# Patient Record
Sex: FEMALE | Race: AMERICAN INDIAN OR ALASKA NATIVE | NOT HISPANIC OR LATINO | Employment: FULL TIME | URBAN - METROPOLITAN AREA
[De-identification: names, ages, dates, MRNs, and addresses within clinical notes are randomized per-mention and may not be internally consistent; named-entity substitution may affect disease eponyms.]

---

## 2018-10-05 ENCOUNTER — TELEPHONE (OUTPATIENT)
Dept: HEMATOLOGY ONCOLOGY | Facility: MEDICAL CENTER | Age: 48
End: 2018-10-05

## 2018-10-05 NOTE — TELEPHONE ENCOUNTER
1st attempt to contact the patient- Left voicemail for patient requesting a return call to schedule New Oncology Genetic appointment. (Remind patient to bring a list/ or questionnaire, of her cancer related family history)  NP/ PEBP/ Breast Cancer/Dr. Acosta

## 2018-10-24 ENCOUNTER — TELEPHONE (OUTPATIENT)
Dept: HEMATOLOGY ONCOLOGY | Facility: MEDICAL CENTER | Age: 48
End: 2018-10-24

## 2018-10-24 NOTE — TELEPHONE ENCOUNTER
Patient cancelled upcoming appointment with Dr. Gatica on 10/29/2018 at 9:00 am. Patient is having surgery on 11/12/2018 (mastectomy) and would like to wait until afterwards. Will call back as soon as possible.

## 2018-10-29 ENCOUNTER — APPOINTMENT (OUTPATIENT)
Dept: HEMATOLOGY ONCOLOGY | Facility: MEDICAL CENTER | Age: 48
End: 2018-10-29
Payer: COMMERCIAL

## 2018-10-30 ENCOUNTER — TELEPHONE (OUTPATIENT)
Dept: HEMATOLOGY ONCOLOGY | Facility: MEDICAL CENTER | Age: 48
End: 2018-10-30

## 2018-10-30 NOTE — TELEPHONE ENCOUNTER
2nd attempt to schedule patient:    Called patient to schedule new patient Genetics appointment with Dr. Gatica. Patient states she is trying to coordinate with her follow ups from surgery. States she doesn't know the dates yet but will call us as soon as she has the dates to schedule.    NP/ PEBP/Dx: Breast Cancer/Ref: Dr. Acosta

## 2018-11-12 ENCOUNTER — APPOINTMENT (OUTPATIENT)
Dept: RADIOLOGY | Facility: MEDICAL CENTER | Age: 48
End: 2018-11-12
Attending: SURGERY
Payer: COMMERCIAL

## 2018-11-12 ENCOUNTER — HOSPITAL ENCOUNTER (OUTPATIENT)
Facility: MEDICAL CENTER | Age: 48
End: 2018-11-13
Attending: SURGERY | Admitting: SURGERY
Payer: COMMERCIAL

## 2018-11-12 DIAGNOSIS — D05.11 DUCTAL CARCINOMA IN SITU (DCIS) OF RIGHT BREAST: ICD-10-CM

## 2018-11-12 DIAGNOSIS — G89.18 ACUTE POST-OPERATIVE PAIN: ICD-10-CM

## 2018-11-12 LAB — PATHOLOGY CONSULT NOTE: NORMAL

## 2018-11-12 PROCEDURE — 88307 TISSUE EXAM BY PATHOLOGIST: CPT | Mod: 59

## 2018-11-12 PROCEDURE — 700111 HCHG RX REV CODE 636 W/ 250 OVERRIDE (IP): Performed by: SURGERY

## 2018-11-12 PROCEDURE — A6222 GAUZE <=16 IN NO W/SAL W/O B: HCPCS | Performed by: SURGERY

## 2018-11-12 PROCEDURE — A6223 GAUZE >16<=48 NO W/SAL W/O B: HCPCS | Performed by: SURGERY

## 2018-11-12 PROCEDURE — 160025 RECOVERY II MINUTES (STATS): Performed by: SURGERY

## 2018-11-12 PROCEDURE — 700101 HCHG RX REV CODE 250

## 2018-11-12 PROCEDURE — 501838 HCHG SUTURE GENERAL: Performed by: SURGERY

## 2018-11-12 PROCEDURE — 160036 HCHG PACU - EA ADDL 30 MINS PHASE I: Performed by: SURGERY

## 2018-11-12 PROCEDURE — 160009 HCHG ANES TIME/MIN: Performed by: SURGERY

## 2018-11-12 PROCEDURE — 700111 HCHG RX REV CODE 636 W/ 250 OVERRIDE (IP)

## 2018-11-12 PROCEDURE — 700102 HCHG RX REV CODE 250 W/ 637 OVERRIDE(OP): Performed by: SURGERY

## 2018-11-12 PROCEDURE — 700111 HCHG RX REV CODE 636 W/ 250 OVERRIDE (IP): Performed by: ANESTHESIOLOGY

## 2018-11-12 PROCEDURE — 76098 X-RAY EXAM SURGICAL SPECIMEN: CPT | Mod: RT

## 2018-11-12 PROCEDURE — 38792 RA TRACER ID OF SENTINL NODE: CPT | Mod: RT

## 2018-11-12 PROCEDURE — G0378 HOSPITAL OBSERVATION PER HR: HCPCS

## 2018-11-12 PROCEDURE — 160048 HCHG OR STATISTICAL LEVEL 1-5: Performed by: SURGERY

## 2018-11-12 PROCEDURE — 500054 HCHG BANDAGE, ELASTIC 6: Performed by: SURGERY

## 2018-11-12 PROCEDURE — A6404 STERILE GAUZE > 48 SQ IN: HCPCS | Performed by: SURGERY

## 2018-11-12 PROCEDURE — 88342 IMHCHEM/IMCYTCHM 1ST ANTB: CPT

## 2018-11-12 PROCEDURE — A6402 STERILE GAUZE <= 16 SQ IN: HCPCS | Performed by: SURGERY

## 2018-11-12 PROCEDURE — 501497 HCHG SURGICLIP: Performed by: SURGERY

## 2018-11-12 PROCEDURE — 700105 HCHG RX REV CODE 258: Performed by: ANESTHESIOLOGY

## 2018-11-12 PROCEDURE — 96365 THER/PROPH/DIAG IV INF INIT: CPT

## 2018-11-12 PROCEDURE — 500448 HCHG DRESSING, TELFA 3X4: Performed by: SURGERY

## 2018-11-12 PROCEDURE — 88309 TISSUE EXAM BY PATHOLOGIST: CPT

## 2018-11-12 PROCEDURE — 160002 HCHG RECOVERY MINUTES (STAT): Performed by: SURGERY

## 2018-11-12 PROCEDURE — 96375 TX/PRO/DX INJ NEW DRUG ADDON: CPT

## 2018-11-12 PROCEDURE — 501445 HCHG STAPLER, SKIN DISP: Performed by: SURGERY

## 2018-11-12 PROCEDURE — 88331 PATH CONSLTJ SURG 1 BLK 1SPC: CPT

## 2018-11-12 PROCEDURE — 160029 HCHG SURGERY MINUTES - 1ST 30 MINS LEVEL 4: Performed by: SURGERY

## 2018-11-12 PROCEDURE — 88332 PATH CONSLTJ SURG EA ADD BLK: CPT

## 2018-11-12 PROCEDURE — 160046 HCHG PACU - 1ST 60 MINS PHASE II: Performed by: SURGERY

## 2018-11-12 PROCEDURE — 110372 HCHG SHELL REV 278: Performed by: SURGERY

## 2018-11-12 PROCEDURE — A9270 NON-COVERED ITEM OR SERVICE: HCPCS | Performed by: SURGERY

## 2018-11-12 PROCEDURE — 160035 HCHG PACU - 1ST 60 MINS PHASE I: Performed by: SURGERY

## 2018-11-12 PROCEDURE — 700101 HCHG RX REV CODE 250: Performed by: SURGERY

## 2018-11-12 PROCEDURE — 160041 HCHG SURGERY MINUTES - EA ADDL 1 MIN LEVEL 4: Performed by: SURGERY

## 2018-11-12 DEVICE — IMPLANTABLE DEVICE: Type: IMPLANTABLE DEVICE | Site: BREAST | Status: FUNCTIONAL

## 2018-11-12 RX ORDER — OXYCODONE HYDROCHLORIDE 5 MG/1
5 TABLET ORAL
Status: DISCONTINUED | OUTPATIENT
Start: 2018-11-12 | End: 2018-11-12

## 2018-11-12 RX ORDER — HALOPERIDOL 5 MG/ML
1 INJECTION INTRAMUSCULAR
Status: DISCONTINUED | OUTPATIENT
Start: 2018-11-12 | End: 2018-11-12

## 2018-11-12 RX ORDER — OXYCODONE HYDROCHLORIDE 10 MG/1
10 TABLET ORAL
Status: DISCONTINUED | OUTPATIENT
Start: 2018-11-12 | End: 2018-11-12

## 2018-11-12 RX ORDER — MEPERIDINE HYDROCHLORIDE 25 MG/ML
25 INJECTION INTRAMUSCULAR; INTRAVENOUS; SUBCUTANEOUS
Status: DISCONTINUED | OUTPATIENT
Start: 2018-11-12 | End: 2018-11-12

## 2018-11-12 RX ORDER — EPINEPHRINE 1 MG/ML
INJECTION INTRAMUSCULAR; INTRAVENOUS; SUBCUTANEOUS
Status: COMPLETED
Start: 2018-11-12 | End: 2018-11-12

## 2018-11-12 RX ORDER — DIPHENHYDRAMINE HCL 25 MG
25 TABLET ORAL EVERY 6 HOURS PRN
Status: DISCONTINUED | OUTPATIENT
Start: 2018-11-12 | End: 2018-11-13 | Stop reason: HOSPADM

## 2018-11-12 RX ORDER — ASCORBIC ACID 500 MG
500 TABLET ORAL DAILY
Status: DISCONTINUED | OUTPATIENT
Start: 2018-11-12 | End: 2018-11-13 | Stop reason: HOSPADM

## 2018-11-12 RX ORDER — ONDANSETRON 2 MG/ML
4 INJECTION INTRAMUSCULAR; INTRAVENOUS EVERY 4 HOURS PRN
Status: DISCONTINUED | OUTPATIENT
Start: 2018-11-12 | End: 2018-11-13 | Stop reason: HOSPADM

## 2018-11-12 RX ORDER — HYDROMORPHONE HYDROCHLORIDE 1 MG/ML
0.5 INJECTION, SOLUTION INTRAMUSCULAR; INTRAVENOUS; SUBCUTANEOUS
Status: DISCONTINUED | OUTPATIENT
Start: 2018-11-12 | End: 2018-11-12

## 2018-11-12 RX ORDER — ASCORBIC ACID 500 MG
500 TABLET ORAL DAILY
COMMUNITY

## 2018-11-12 RX ORDER — SODIUM CHLORIDE, SODIUM LACTATE, POTASSIUM CHLORIDE, CALCIUM CHLORIDE 600; 310; 30; 20 MG/100ML; MG/100ML; MG/100ML; MG/100ML
INJECTION, SOLUTION INTRAVENOUS ONCE
Status: COMPLETED | OUTPATIENT
Start: 2018-11-12 | End: 2018-11-12

## 2018-11-12 RX ORDER — ONDANSETRON 4 MG/1
4 TABLET, FILM COATED ORAL EVERY 6 HOURS PRN
Qty: 6 TAB | Refills: 2 | Status: SHIPPED | OUTPATIENT
Start: 2018-11-12 | End: 2020-07-14

## 2018-11-12 RX ORDER — DIPHENHYDRAMINE HYDROCHLORIDE 50 MG/ML
12.5 INJECTION INTRAMUSCULAR; INTRAVENOUS
Status: DISCONTINUED | OUTPATIENT
Start: 2018-11-12 | End: 2018-11-12

## 2018-11-12 RX ORDER — LEVOTHYROXINE SODIUM 112 UG/1
112 TABLET ORAL
Status: DISCONTINUED | OUTPATIENT
Start: 2018-11-12 | End: 2018-11-13 | Stop reason: HOSPADM

## 2018-11-12 RX ORDER — LIDOCAINE AND PRILOCAINE 25; 25 MG/G; MG/G
CREAM TOPICAL ONCE
Status: COMPLETED | OUTPATIENT
Start: 2018-11-12 | End: 2018-11-12

## 2018-11-12 RX ORDER — SODIUM CHLORIDE, SODIUM LACTATE, POTASSIUM CHLORIDE, CALCIUM CHLORIDE 600; 310; 30; 20 MG/100ML; MG/100ML; MG/100ML; MG/100ML
INJECTION, SOLUTION INTRAVENOUS CONTINUOUS
Status: DISCONTINUED | OUTPATIENT
Start: 2018-11-12 | End: 2018-11-12

## 2018-11-12 RX ORDER — OXYCODONE HYDROCHLORIDE 5 MG/1
5-10 TABLET ORAL EVERY 6 HOURS PRN
Qty: 40 TAB | Refills: 0 | Status: SHIPPED | OUTPATIENT
Start: 2018-11-12 | End: 2018-11-22

## 2018-11-12 RX ORDER — SODIUM CHLORIDE, SODIUM LACTATE, POTASSIUM CHLORIDE, CALCIUM CHLORIDE 600; 310; 30; 20 MG/100ML; MG/100ML; MG/100ML; MG/100ML
INJECTION, SOLUTION INTRAVENOUS CONTINUOUS
Status: DISCONTINUED | OUTPATIENT
Start: 2018-11-12 | End: 2018-11-13 | Stop reason: HOSPADM

## 2018-11-12 RX ORDER — OXYCODONE HYDROCHLORIDE 5 MG/1
5 TABLET ORAL EVERY 4 HOURS PRN
Status: DISCONTINUED | OUTPATIENT
Start: 2018-11-12 | End: 2018-11-13 | Stop reason: HOSPADM

## 2018-11-12 RX ORDER — HYDROMORPHONE HYDROCHLORIDE 1 MG/ML
0.25 INJECTION, SOLUTION INTRAMUSCULAR; INTRAVENOUS; SUBCUTANEOUS
Status: DISCONTINUED | OUTPATIENT
Start: 2018-11-12 | End: 2018-11-12

## 2018-11-12 RX ORDER — ALPRAZOLAM 0.25 MG/1
0.25 TABLET ORAL
Status: COMPLETED | OUTPATIENT
Start: 2018-11-12 | End: 2018-11-12

## 2018-11-12 RX ORDER — OXYCODONE HCL 5 MG/5 ML
5 SOLUTION, ORAL ORAL
Status: DISCONTINUED | OUTPATIENT
Start: 2018-11-12 | End: 2018-11-12

## 2018-11-12 RX ORDER — CEPHALEXIN 500 MG/1
500 CAPSULE ORAL 3 TIMES DAILY
Qty: 42 CAP | Refills: 0 | Status: ON HOLD | OUTPATIENT
Start: 2018-11-12 | End: 2019-03-14

## 2018-11-12 RX ORDER — BUPIVACAINE HYDROCHLORIDE 2.5 MG/ML
INJECTION, SOLUTION EPIDURAL; INFILTRATION; INTRACAUDAL
Status: COMPLETED
Start: 2018-11-12 | End: 2018-11-12

## 2018-11-12 RX ORDER — SCOLOPAMINE TRANSDERMAL SYSTEM 1 MG/1
1 PATCH, EXTENDED RELEASE TRANSDERMAL
Status: DISCONTINUED | OUTPATIENT
Start: 2018-11-12 | End: 2018-11-12 | Stop reason: HOSPADM

## 2018-11-12 RX ORDER — CEFAZOLIN SODIUM 2 G/100ML
2 INJECTION, SOLUTION INTRAVENOUS EVERY 8 HOURS
Status: DISCONTINUED | OUTPATIENT
Start: 2018-11-12 | End: 2018-11-13 | Stop reason: HOSPADM

## 2018-11-12 RX ORDER — BUPIVACAINE HYDROCHLORIDE AND EPINEPHRINE 5; 5 MG/ML; UG/ML
INJECTION, SOLUTION EPIDURAL; INTRACAUDAL; PERINEURAL
Status: COMPLETED
Start: 2018-11-12 | End: 2018-11-12

## 2018-11-12 RX ORDER — OXYCODONE HYDROCHLORIDE 10 MG/1
10 TABLET ORAL EVERY 4 HOURS PRN
Status: DISCONTINUED | OUTPATIENT
Start: 2018-11-12 | End: 2018-11-13 | Stop reason: HOSPADM

## 2018-11-12 RX ORDER — OXYCODONE HCL 5 MG/5 ML
10 SOLUTION, ORAL ORAL
Status: DISCONTINUED | OUTPATIENT
Start: 2018-11-12 | End: 2018-11-12

## 2018-11-12 RX ORDER — SCOLOPAMINE TRANSDERMAL SYSTEM 1 MG/1
1 PATCH, EXTENDED RELEASE TRANSDERMAL
Status: DISCONTINUED | OUTPATIENT
Start: 2018-11-12 | End: 2018-11-13 | Stop reason: HOSPADM

## 2018-11-12 RX ORDER — HYDROMORPHONE HYDROCHLORIDE 1 MG/ML
0.1 INJECTION, SOLUTION INTRAMUSCULAR; INTRAVENOUS; SUBCUTANEOUS
Status: DISCONTINUED | OUTPATIENT
Start: 2018-11-12 | End: 2018-11-12

## 2018-11-12 RX ORDER — ONDANSETRON 2 MG/ML
4 INJECTION INTRAMUSCULAR; INTRAVENOUS
Status: DISCONTINUED | OUTPATIENT
Start: 2018-11-12 | End: 2018-11-12

## 2018-11-12 RX ADMIN — SCOPALAMINE 1 PATCH: 1 PATCH, EXTENDED RELEASE TRANSDERMAL at 20:23

## 2018-11-12 RX ADMIN — LIDOCAINE AND PRILOCAINE 1 APPLICATION: 25; 25 CREAM TOPICAL at 09:11

## 2018-11-12 RX ADMIN — ONDANSETRON 4 MG: 2 INJECTION INTRAMUSCULAR; INTRAVENOUS at 17:01

## 2018-11-12 RX ADMIN — SODIUM CHLORIDE, SODIUM LACTATE, POTASSIUM CHLORIDE, CALCIUM CHLORIDE 1000 ML: 600; 310; 30; 20 INJECTION, SOLUTION INTRAVENOUS at 09:11

## 2018-11-12 RX ADMIN — OXYCODONE HYDROCHLORIDE 5 MG: 5 TABLET ORAL at 18:36

## 2018-11-12 RX ADMIN — HYDROMORPHONE HYDROCHLORIDE 0.25 MG: 1 INJECTION, SOLUTION INTRAMUSCULAR; INTRAVENOUS; SUBCUTANEOUS at 14:59

## 2018-11-12 RX ADMIN — SODIUM CHLORIDE, POTASSIUM CHLORIDE, SODIUM LACTATE AND CALCIUM CHLORIDE: 600; 310; 30; 20 INJECTION, SOLUTION INTRAVENOUS at 20:21

## 2018-11-12 RX ADMIN — HYDROMORPHONE HYDROCHLORIDE 0.25 MG: 1 INJECTION, SOLUTION INTRAMUSCULAR; INTRAVENOUS; SUBCUTANEOUS at 15:30

## 2018-11-12 RX ADMIN — SODIUM CHLORIDE, POTASSIUM CHLORIDE, SODIUM LACTATE AND CALCIUM CHLORIDE: 600; 310; 30; 20 INJECTION, SOLUTION INTRAVENOUS at 17:01

## 2018-11-12 RX ADMIN — CEFAZOLIN SODIUM 2 G: 2 INJECTION, SOLUTION INTRAVENOUS at 18:35

## 2018-11-12 RX ADMIN — ALPRAZOLAM 0.25 MG: 0.25 TABLET ORAL at 09:11

## 2018-11-12 ASSESSMENT — LIFESTYLE VARIABLES: ALCOHOL_USE: NO

## 2018-11-12 ASSESSMENT — PATIENT HEALTH QUESTIONNAIRE - PHQ9
SUM OF ALL RESPONSES TO PHQ9 QUESTIONS 1 AND 2: 0
SUM OF ALL RESPONSES TO PHQ9 QUESTIONS 1 AND 2: 0
1. LITTLE INTEREST OR PLEASURE IN DOING THINGS: NOT AT ALL
2. FEELING DOWN, DEPRESSED, IRRITABLE, OR HOPELESS: NOT AT ALL
2. FEELING DOWN, DEPRESSED, IRRITABLE, OR HOPELESS: NOT AT ALL
1. LITTLE INTEREST OR PLEASURE IN DOING THINGS: NOT AT ALL

## 2018-11-12 ASSESSMENT — COGNITIVE AND FUNCTIONAL STATUS - GENERAL
SUGGESTED CMS G CODE MODIFIER DAILY ACTIVITY: CH
MOBILITY SCORE: 24
DAILY ACTIVITIY SCORE: 24
SUGGESTED CMS G CODE MODIFIER MOBILITY: CH

## 2018-11-12 ASSESSMENT — COPD QUESTIONNAIRES
HAVE YOU SMOKED AT LEAST 100 CIGARETTES IN YOUR ENTIRE LIFE: NO/DON'T KNOW
IN THE PAST 12 MONTHS DO YOU DO LESS THAN YOU USED TO BECAUSE OF YOUR BREATHING PROBLEMS: DISAGREE/UNSURE
DO YOU EVER COUGH UP ANY MUCUS OR PHLEGM?: NO/ONLY WITH OCCASIONAL COLDS OR INFECTIONS
DURING THE PAST 4 WEEKS HOW MUCH DID YOU FEEL SHORT OF BREATH: NONE/LITTLE OF THE TIME
COPD SCREENING SCORE: 0

## 2018-11-12 ASSESSMENT — PAIN SCALES - GENERAL
PAINLEVEL_OUTOF10: 5
PAINLEVEL_OUTOF10: 0
PAINLEVEL_OUTOF10: 4
PAINLEVEL_OUTOF10: 4
PAINLEVEL_OUTOF10: 7
PAINLEVEL_OUTOF10: 4

## 2018-11-12 NOTE — OR NURSING
1345- Report from ARASH Haskins. Assumed care for break coverage.  1353- Pt moaning, medicated for assumed pain,  at bedside.   1417- Pt moaning again in pain, medicated with 25 mcg of fentanyl.  1421- Pt took a sip of water, back to sleep at this time.   1446- pt moaning in pain, medicated per MAR. Report back to ARASH Haskins.

## 2018-11-12 NOTE — OR NURSING
REPORT FROM HELGA ANTOINE.  PATIENT SLEEPING  VSS.  DRAINS EMPTIED.  RIGHT ARM ELEVATED ON PILLOW  1458 PATIENT RATES PAIN 5/10  CONTINUE TO MEDICATE  1515 SLEEPING.   REMAINS AT BEDSIDE  1530 EASILY AWAKENED.  STATES PAIN IMPROVED.  REQUESTS MORE PAIN MED.  GIVEN PER ORDER.  1548 SLEEPING.  DRESSINGS DRY AND IN TACT. REPORT TO ELIDA ANTOINE.  TRANSPORT REQUESTED.  1630 T/C FROM DR MCCOLLUM. ADVISED OF BLOOD PRESSURE. PER DR MCCOLLUM  PATIENT APPROPRIATE FOR TRANSFER.    1638 TRANSPORT HERE.   AT SIDE WITH BELONGINGS.

## 2018-11-12 NOTE — H&P
Surgery Plastic History & Physical Note    Date  11/12/2018    Primary Care Physician  Kyle Gore M.D.    CC  Scheduled for bilateral mastectomy  HPI  This is a 48 y.o. 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 at SURGERY SAME DAY [facility]   • THYROID LOBECTOMY  1/8/2014    Performed by Caitlin Mooney M.D. at SURGERY SAME DAY [facility]   • GASTRIC SLEEVE LAPAROSCOPY  4/1/2013    Performed by John H Ganser, M.D. at SURGERY [facility]   • HYSTERECTOMY LAPAROSCOPY  2008   • OTHER ORTHOPEDIC SURGERY  1989    right knee arthroscopic surgery   • OTHER  [id number]    lumps removed from both breasts multiple surgeries       Current Facility-Administered Medications   Medication Dose Route Frequency Provider Last Rate Last Dose   • BUPIVACAINE-EPINEPHRINE (PF) 0.5% -1:[id number] INJ SOLN            • BUPIVACAINE HCL (PF) 0.25 % INJ SOLN            • EPINEPHRINE 1 MG/ML INJ SOLN            • METHYLENE BLUE 1 % INJ SOLN                Social History     Social History   • Marital status:      Spouse name: N/A   • Number of children: N/A   • Years of education: N/A     Occupational History   • Not on file.     Social History Main Topics   • Smoking status: Never Smoker   • Smokeless tobacco: Never Used   • Alcohol use No   • Drug use: No   • Sexual activity: Not on file     Other Topics Concern   • Not on file     Social History  Narrative   • No narrative on file       Family History   Problem Relation Age of Onset   • Heart Disease Unknown    • Hypertension Unknown    • Cancer Unknown    • Stroke Unknown        Allergies  Other food    Review of Systems  Non contributory      Physical Exam    Vital Signs  Blood Pressure: 134/89   Temperature: 37.1 °C (98.8 °F)   Pulse: 67   Respiration: 18   Pulse Oximetry: 98 %     Lungs are clear  Heart is RR    Labs:None                    Radiology:  NM-INJ TRACER ID SENTINAL NODE RIGHT   Final Result      Successful injection of radionuclide at 4 locations surrounding the RIGHT breast areola.            Assessment/Plan:    Procedure(s):  MASTECTOMY- SIMPLE  NODE BIOPSY SENTINEL- AXILLARY LYMPH  AXILLARY NODE DISSECTION- POSS  BREAST RECONSTRUCTION  TISSUE EXPANDER PLACE/REMOVE

## 2018-11-12 NOTE — OR SURGEON
Immediate Post OP Note    PreOp Diagnosis: acquired absence of bilateral breast and nipple    PostOp Diagnosis: same    Procedure(s):  MASTECTOMY- SIMPLE - Wound Class: Clean  NODE BIOPSY SENTINEL- AXILLARY LYMPH - Wound Class: Clean  BREAST RECONSTRUCTION - Wound Class: Clean  TISSUE EXPANDER PLACE/REMOVE - Wound Class: Clean    Surgeon(s):  JOE Multani Jr., M.D. Sharon L Wright, M.D.    Anesthesiologist/Type of Anesthesia:  Anesthesiologist: Hima Sanchez M.D./General    Surgical Staff:  Assistant: Nhi Boss  Circulator: Luigi Smith R.N.  Relief Scrub: Colette Painter  Scrub Person: Valentina Heath    Specimens removed if any:  ID Type Source Tests Collected by Time Destination   A : Left breast Tissue Breast HISTOLOGY REQUEST Ammy Cabrera M.D. 11/12/2018 12:12 PM    B : Right breast Tissue Breast HISTOLOGY REQUEST Ammy Cabrera M.D. 11/12/2018 12:22 PM    C : Right axillary sentinal lymph node Tissue Lymph Node FROZEN SECTION REQUEST Ammy Cabrera M.D. 11/12/2018 12:34 PM        Estimated Blood Loss: 10 ml    Findings: absent breasts    Complications: none        11/12/2018 1:24 PM Tee Hayes M.D.

## 2018-11-12 NOTE — OP REPORT
Operative Report     Date: 11/12/2018     Surgeon: Ammy Cabrera M.D.     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 right simple mastectomy (19303     Mastectomy, simple, complete)  2. right axillary sentinel lymph node biopsy (58454     Biopsy or excision of lymph node(s); open, deep axillary node(s))  3. injection for identification of sentinel lymph node (98182 Intraoperative identification (eg, mapping) of sentinel lymph node(s) includes injection of non-radioactive dye)  4. left simple mastectomy (19303   Mastectomy, simple, complete)     Findings: negative sentinel lymph node.     Procedure in detail: The patient was identified in the pre-operative holding area and brought to the operating room. Correct side and site were identified. Prior to surgery, she had undergone radionucleotide injection by nuclear medicine. Pre-operative antibiotics of ancef were administered prior to the procedure. GETA was smoothly induced.     I infiltrated 5 cc of methylene blue under the right nipple areolar complex in order to help identify the sentinel lymph node.      The patient's bilateral anterior chest wall, neck, axilla, and left arm were prepped and draped in the usual sterile manner. We started on the left side. The incision was marked along the skin with a skin marker. A transverse elliptical incision was made in the breast of the skin to include nipple areolar complex. The flaps were raised superiorly to just below the clavicle, medially to the sternum, laterally towards the latissimus dorsi, and inferiorly to the rectus abdominus fascia. Following this, the breast tissue along with the pectoralis major fascia were dissected off the pectoralis major muscle. The dissection was started medially and extended laterally towards the left axilla. The breast was  "then removed, oriented with suture, and passed off the table.     We then ensured hemostasis on the left, and turned our attention to the right side.     The incision was marked along the skin with a skin marker. A transverse elliptical incision was made in the breast of the skin to include nipple areolar complex. The flaps were raised superiorly to just below the clavicle, medially to the sternum, laterally towards the latissimus dorsi, and inferiorly to the rectus abdominus fascia. Following this, the breast tissue along with the pectoralis major fascia were dissected off the pectoralis major muscle. The dissection was started medially and extended laterally towards the left axilla. The breast was then removed, oriented with suture, and passed off the table.     We then proceeded with the sentinel lymph node excision. Using the probe, we identified a \"hot\" right axillary sentinel lymph node, which was also blue stained. This was excised and sent for frozen pathology, which revealed no evidence of metastatic cancer.     After the tissues were irrigated, and hemostasis was assured, I turned the operation over to Dr. Hayes, for reconstruction.     The patient was awakened from general anesthetic, and was taken to the recovery room in stable condition.     Sponge and needle counts were correct at the end of the case.      Specimen:   1. Left breast  2. Right breast  3. Right axillary sentinel lymph node tissue    EBL: 200     Drains: 10 mm drain bilateral mastectomy site     Dispo: stable, extubated, to PACU     Ammy Cabrera M.D.  [facility]  [phone]          "

## 2018-11-12 NOTE — OR NURSING
RECEIVED FROM OR WITH DR MCCOLLUM.  ET TUBE DC'D BY DR MCCOLLUM UPON ARRIVAL.  VSS ACE WRAP AROUND CHEST DRY AND IN TACT  ESTEBAN DRAIN ON EACH SIDE.  1345 SEEMS CONFUSED AND MOANING.  MEDICATED PER ORDER AND  BROUGHT TO BEDSIDE.  REPORT TO HELGA ANTOINE

## 2018-11-12 NOTE — OP REPORT
DATE OF SERVICE:  11/12/2018    PREOPERATIVE DIAGNOSIS:  Acquired absence of bilateral breasts and nipple.    POSTOPERATIVE DIAGNOSIS:  Acquired absence of bilateral breasts and nipple.    PROCEDURE PERFORMED:  Bilateral breast reconstruction utilizing soft tissue   expander.    SURGEON:  Tee Hayes MD    ASSISTANT:  ANDREW Kahn    OPERATIVE INDICATIONS:  A 48-year-old female.  She was scheduled to undergo   bilateral mastectomies.  Preoperatively, I had discussions with the patient   regarding the risks, benefits, alternatives to proceeding with breast   reconstruction in the immediate timeframe.  Over the course of our discussion,   the patient selected a soft tissue expander based reconstructive option and   we further discussed this.  At the conclusion of our consultation, the patient   understood the risks, benefits, alternatives of the procedure and wished to   proceed.  The patient was interviewed in the preoperative holding area.  She   was marked and she consented to proceed with surgery.    OPERATIVE DESCRIPTION:  Dr. Cabrera's portion will be dictated separately.  At   the conclusion of Dr. Cabrera's portion, the operative sites were inspected and   found to be hemostatic.  On both sides, the pectoralis major muscle was   divided inferiorly and inferomedially under direct vision using   electrocautery.  Two symmetric subpectoral pockets were made under direct   vision using cautery.  Perforating blood vessels were encountered, they were   grasped and cauterized before dividing them.  The pockets were made symmetric.    The pockets were made hemostatic.  The pockets were irrigated with an   antibiotic containing irrigant solution.  Two 500 mL high profile Altouro  expanders were selected.  They were evacuated of air.  They were bathed in   antibiotic saline solution.  They were placed using a no-touch technique.    They oriented so that they were symmetric and in the appropriate position.    The  suture tabs were used to secure the expander to the chest wall using a 2-0   Vicryl suture.  A portion of precut perforated AlloDerm was used on both   sides.  Before being placed, it was bathed in antibiotic saline solution.  The   AlloDerm was secured around the inferior edge of the pectoralis major muscle   and also out to the inframammary fold.  Wounds were irrigated.  Again,   hemostasis was assured.  Two 7 flat Reginald drains were brought out through   separate stab incisions.  Wounds were closed in layers.  At the conclusion of   closure, the magnetic port was used and the port was identified on both sites.    Both expanders were filled to 300 mL using a closed fill system.  Sterile   dressings and a compressive bra were applied.  The patient was extubated and   taken to recovery room in good condition.       ____________________________________     MD FIDEL ACOSTA / BLANQUITA    DD:  11/12/2018 15:16:58  DT:  11/12/2018 15:41:08    D#:  [id number]  Job#:  [id number]

## 2018-11-13 VITALS
RESPIRATION RATE: 16 BRPM | OXYGEN SATURATION: 97 % | HEIGHT: 66 IN | BODY MASS INDEX: 34.01 KG/M2 | TEMPERATURE: 97.7 F | WEIGHT: 211.64 LBS | SYSTOLIC BLOOD PRESSURE: 109 MMHG | DIASTOLIC BLOOD PRESSURE: 63 MMHG | HEART RATE: 63 BPM

## 2018-11-13 PROCEDURE — 96366 THER/PROPH/DIAG IV INF ADDON: CPT

## 2018-11-13 PROCEDURE — G0378 HOSPITAL OBSERVATION PER HR: HCPCS

## 2018-11-13 PROCEDURE — 700102 HCHG RX REV CODE 250 W/ 637 OVERRIDE(OP): Performed by: SURGERY

## 2018-11-13 PROCEDURE — A9270 NON-COVERED ITEM OR SERVICE: HCPCS | Performed by: SURGERY

## 2018-11-13 PROCEDURE — 700111 HCHG RX REV CODE 636 W/ 250 OVERRIDE (IP): Performed by: SURGERY

## 2018-11-13 RX ADMIN — LEVOTHYROXINE SODIUM 112 MCG: 112 TABLET ORAL at 05:11

## 2018-11-13 RX ADMIN — OXYCODONE HYDROCHLORIDE 5 MG: 5 TABLET ORAL at 05:25

## 2018-11-13 RX ADMIN — CEFAZOLIN SODIUM 2 G: 2 INJECTION, SOLUTION INTRAVENOUS at 05:11

## 2018-11-13 RX ADMIN — OXYCODONE HYDROCHLORIDE AND ACETAMINOPHEN 500 MG: 500 TABLET ORAL at 05:11

## 2018-11-13 ASSESSMENT — PAIN SCALES - GENERAL: PAINLEVEL_OUTOF10: 5

## 2018-11-13 NOTE — CARE PLAN
Problem: Safety  Goal: Will remain free from injury  Outcome: PROGRESSING AS EXPECTED  Patient standby assist, steady on feet.     Problem: Pain Management  Goal: Pain level will decrease to patient's comfort goal  Outcome: PROGRESSING AS EXPECTED   11/12/18 2000   OTHER   Pain Scale 0 - 10  4   Non Verbal Scale  Calm   Comfort Goal Comfort at Rest

## 2018-11-13 NOTE — PROGRESS NOTES
Bedside report received.  Assessment complete.  Neuro, cardiac, GI, , WDL  A&O x 4. Patient calls appropriately.  Patient up with no assist. Bed alarm NI.   Patient has 0/10 pain.   Denies N&V. Tolerating regular diet.  Skin: Surgical incisions to chest with 2x ESTEBAN drains and elastic wrap in place.  + void, + flatus  Patient denies SOB.  SCD's in place.  Patient educated on DC POC.  Review plan with of care with patient. Call light and personal belongings with in reach. Hourly rounding in place. All needs met at this time.

## 2018-11-13 NOTE — PROGRESS NOTES
Pt discharged with DENISA Hilario in W/C. Prescriptions were given x 1. D/C instructions signed and placed in chart. Chart given to U/C. Charge notified. Medications from pts drawer returned to pharmacy. Controlled substance use informed consent signed.

## 2018-11-13 NOTE — PROGRESS NOTES
"Surgical Progress Note:     POD# 1  S/P B mastectomy and R SLN bx     No acute events.    PE:  /68   Pulse (!) 58   Temp 37.1 °C (98.8 °F)   Resp 20   Ht 1.676 m (5' 6\")   Wt 96 kg (211 lb 10.3 oz)   SpO2 96%   Breastfeeding? No   BMI 34.16 kg/m²     I/O:   Intake/Output Summary (Last 24 hours) at 11/13/18 0800  Last data filed at 11/13/18 0600   Gross per 24 hour   Intake             2250 ml   Output             1776 ml   Net              474 ml     UOP: 1250  PO: 650  IV: 1600  Drains: 148/128    Incisions c/d/i  JPs s/s    A/P: POD# 1  S/P B mastectomy and R SLN bx  - doing well  - d/c home  - f/u with me and Freddy Cabrera M.D.  [facility]  [phone]      "

## 2018-11-13 NOTE — DISCHARGE SUMMARY
DATE OF ADMISSION:  11/12/2018    DATE OF DISCHARGE:  11/13/2018    ADMISSION DIAGNOSIS:  Status post mastectomy with immediate breast   reconstruction.    HOSPITAL NARRATIVE:  On the day of admission, the patient underwent bilateral   mastectomy with a right sentinel node biopsy.  This was accompanied by   immediate breast reconstruction utilizing soft tissue expanders.  The   patient's postoperative course was uneventful.  On the day of discharge,   patient was not having any complaints.  She was afebrile.  She was tolerating   regular diet and had her pain well controlled on oral narcotics.  Patient   understood all of her discharge instructions and followup has been arranged.    DISPOSITION:  To home.    FOLLOWUP:  Followup is with Dr. Cabrera on Tuesday of next week and Dr. Hayes on   Tuesday of next week.    MEDICATIONS AT THE TIME OF DISCHARGE:  Include Keflex 500 mg 1 tab p.o. t.i.d.   while the drains are in place, OxyIR 5-10 mg tablets q. 4 hours p.r.n., and   Zofran 4 mg tablets q. 6 hours p.r.n. nausea.    SPECIAL INSTRUCTIONS:  Patient was educated on wound care and showering.    Patient was also told to remove her Ace wrap in 2 days and replace it with a   sports bra.       ____________________________________     MD FIDEL ACOSTA / BLANQUITA    DD:  11/13/2018 08:01:34  DT:  11/13/2018 08:26:16    D#:  [id number]  Job#:  [id number]

## 2018-11-13 NOTE — PROGRESS NOTES
Received report from day shift RN. Patient resting in bed, A&Ox4, call bell in reach, RA,  at bedside, post op VS complete, LR@100-20LAC, 2 ESTEBAN drains present, denies pain at this time, PRN scop. Patch available for nausea. Patient agrees to call for any needs during shift.

## 2018-11-13 NOTE — DISCHARGE INSTRUCTIONS
Keep ACE wrap in place for 48 hours then DC.   Wear sports bra once ACE wrap is removed   OK to shower at 48 hours   Call with any worries or concerns   Call if one breast is greatly enlarged compared with the other.Keep ACE wrap in place for 48 hours then DC.   Wear sports bra once ACE wrap is removed   OK to shower at 48 hours   Call with any worries or concerns   Call if one breast is greatly enlarged compared with the other.   Routine ESTEBAN care    Discharge Instructions    Discharged to home by car with relative. Discharged via wheelchair, hospital escort: Yes.  Special equipment needed: Not Applicable    Be sure to schedule a follow-up appointment with your primary care doctor or any specialists as instructed.     Discharge Plan:   Influenza Vaccine Indication: Patient Refuses    I understand that a diet low in cholesterol, fat, and sodium is recommended for good health. Unless I have been given specific instructions below for another diet, I accept this instruction as my diet prescription.   Other diet: regular     Special Instructions: None    · Is patient discharged on Warfarin / Coumadin?   No     Depression / Suicide Risk    As you are discharged from this [facility] facility, it is important to learn how to keep safe from harming yourself.    Recognize the warning signs:  · Abrupt changes in personality, positive or negative- including increase in energy   · Giving away possessions  · Change in eating patterns- significant weight changes-  positive or negative  · Change in sleeping patterns- unable to sleep or sleeping all the time   · Unwillingness or inability to communicate  · Depression  · Unusual sadness, discouragement and loneliness  · Talk of wanting to die  · Neglect of personal appearance   · Rebelliousness- reckless behavior  · Withdrawal from people/activities they love  · Confusion- inability to concentrate     If you or a loved one observes any of these behaviors or has concerns about  self-harm, here's what you can do:  · Talk about it- your feelings and reasons for harming yourself  · Remove any means that you might use to hurt yourself (examples: pills, rope, extension cords, firearm)  · Get professional help from the community (Mental Health, Substance Abuse, psychological counseling)  · Do not be alone:Call your Safe Contact- someone whom you trust who will be there for you.  · Call your local CRISIS HOTLINE 424-1131 or 014-273-4478  · Call your local Children's Mobile Crisis Response Team [place] (006) 191-0760 or www.[employer]  · Call the toll free National Suicide Prevention Hotlines   · National Suicide Prevention Lifeline 461-799-JMJV (8027)  · National Hope Line Network 800-SUICIDE (816-0368)        Mastectomy With or Without Reconstruction  Care After  Please read the instructions outlined below and refer to this sheet in the next few weeks. These discharge instructions provide you with general information on caring for yourself after you leave the hospital. Your caregiver may also give you specific instructions. While your treatment has been planned according to the most current medical practices available, unavoidable complications occasionally occur. If you have any problems or questions after discharge, please call your caregiver.  POST-OPERATIVE EXERCISES  · Lie in bed with your arm at your side. Raise your arm straight up and back, as if reaching for the headboard.  · Lying in bed, clasp your hands behind your head and push your elbows into the mattress.  · Raise your shoulders and rotate them forward, down, and back in a circular motion to loosen your chest, shoulder, and upper back muscles.  · Lying with your elbow bent and your arm on the bed at a 90 degree angle to your body, rotate your shoulder forward and bring your forearm down toward your feet. Then bring your forearm back up.  · With your arm raised parallel to the floor, clench and unclench your  "fist.  · Standing with your palm flat against a wall, \"walk\" your fingers up the wall.  SEEK MEDICAL CARE IF:   · There is redness, swelling, or increasing pain in the wound.  · There is pus coming from the wound.  · There is drainage from a wound lasting longer than one day.  · An unexplained oral temperature above 102° F (38.9° C) develops.  · You notice a foul smell coming from the wound or dressing.  · There is a breaking open of a wound (edges not staying together) after the sutures have been removed.  · You develop dizzy episodes or fainting while standing.  · You develop persistent nausea or vomiting.  SEEK IMMEDIATE MEDICAL CARE IF:   · You develop a rash.  · You have difficulty breathing, or develop any reaction or side effects to medications given.  Document Released: 07/07/2006 Document Revised: 03/11/2013 Document Reviewed: 11/19/2008  [employer]® Patient Information ©2014 [employer], [employer].    "

## 2018-11-15 ENCOUNTER — HOSPITAL ENCOUNTER (OUTPATIENT)
Dept: RADIOLOGY | Facility: MEDICAL CENTER | Age: 48
End: 2018-11-15

## 2018-11-27 ENCOUNTER — PATIENT OUTREACH (OUTPATIENT)
Dept: OTHER | Facility: MEDICAL CENTER | Age: 48
End: 2018-11-27

## 2018-12-14 ENCOUNTER — PATIENT OUTREACH (OUTPATIENT)
Dept: OTHER | Facility: MEDICAL CENTER | Age: 48
End: 2018-12-14

## 2018-12-15 NOTE — PROGRESS NOTES
"Oncology Nurse Navigator telephone outreach. Pt states she is doing well post surgery and is now focusing on the reconstruction process. She stated she was appreciative to receive the information regarding classes and programs, but states she put the information away for later \"just in case\". Explained that many of the classes are available virtually if she would like to participate from home. Discussed the contents and purpose of a survivorship care plan and alerted her that we will call to review it with her once she receives it in the mail.   "

## 2018-12-21 ENCOUNTER — PATIENT OUTREACH (OUTPATIENT)
Dept: OTHER | Facility: MEDICAL CENTER | Age: 48
End: 2018-12-21

## 2019-01-15 ENCOUNTER — PATIENT OUTREACH (OUTPATIENT)
Dept: OTHER | Facility: MEDICAL CENTER | Age: 49
End: 2019-01-15

## 2019-01-15 ENCOUNTER — NON-PROVIDER VISIT (OUTPATIENT)
Dept: HEMATOLOGY ONCOLOGY | Facility: MEDICAL CENTER | Age: 49
End: 2019-01-15
Payer: COMMERCIAL

## 2019-01-15 ENCOUNTER — OFFICE VISIT (OUTPATIENT)
Dept: HEMATOLOGY ONCOLOGY | Facility: MEDICAL CENTER | Age: 49
End: 2019-01-15
Payer: COMMERCIAL

## 2019-01-15 DIAGNOSIS — Z15.01 BREAST CANCER GENETIC SUSCEPTIBILITY: ICD-10-CM

## 2019-01-15 DIAGNOSIS — C73 THYROID CANCER (HCC): ICD-10-CM

## 2019-01-15 DIAGNOSIS — C50.411 MALIGNANT NEOPLASM OF UPPER-OUTER QUADRANT OF RIGHT FEMALE BREAST, UNSPECIFIED ESTROGEN RECEPTOR STATUS (HCC): ICD-10-CM

## 2019-01-15 DIAGNOSIS — Z80.3 FAMILY HISTORY OF BREAST CANCER: ICD-10-CM

## 2019-01-15 PROCEDURE — 99243 OFF/OP CNSLTJ NEW/EST LOW 30: CPT | Mod: 25 | Performed by: MEDICAL GENETICS

## 2019-01-15 PROCEDURE — 99358 PROLONG SERVICE W/O CONTACT: CPT | Performed by: MEDICAL GENETICS

## 2019-01-15 RX ORDER — GLATIRAMER 40 MG/ML
INJECTION, SOLUTION SUBCUTANEOUS
Status: ON HOLD | COMMUNITY
End: 2019-03-14

## 2019-01-15 ASSESSMENT — PAIN SCALES - GENERAL: PAINLEVEL: NO PAIN

## 2019-01-15 NOTE — PROGRESS NOTES
Phoned to review breast cancer treatment summary/ survivorship care plan. Pt confirmed she received it and stated she had no questions or concerns at this time regarding treatment summary or follow up plan.

## 2019-01-15 NOTE — PROGRESS NOTES
GENETIC RISK ASSESSMENT    Temi Howard is a 48 y.o. year old patient who was referred by Rick for cancer genetic risk assessment.    Chief Complaint:   Chief Complaint   Patient presents with   • New Patient     Ref \ DX: Breast cancer.       HPI: The patient was well until 5 months ago at which time a suspicious physical exam caused the patient to be referred for imaging. A suspicious (mammogram study identified a (right) breast mass. A biopsy  was performed and a specimen was submitted to pathology.     A diagnosis of  Invasive ductal carcinoma was made.   The patient was also diagosed with thyroid cancer 4 years ago  The patient's family history is remarkable for 4 family members with breast cancer   Review of personal and family histories suggested that a cancer genetic risk assessment was in order.    Review of Systems (ROS):  Constitutional normal   Eyes normal   ENT normla    Respiratory normla   Cardiovascular hnormal  Gastrointestinal normal  Genitourinary  normal  Musculoskeletal normal  Skin normal   Neurological normal  Endocrine s/p thyroidectomy (synthroid)  Psychiatric normal   Hematologic/lymphatic normal  Allergic/Immunologic none  All other systems reviewed and are negative.    History (Past/Family)  Family History:   Family History   Problem Relation Age of Onset   • Heart Disease Unknown    • Hypertension Unknown    • Cancer Unknown    • Stroke Unknown       3 generation pedigree built   Yes   [place] empiric risk calculation No   [place] II empiric risk calculation  No    Social History:       Social History     Social History   • Marital status:      Spouse name: N/A   • Number of children: N/A   • Years of education: N/A     Social History Main Topics   • Smoking status: Never Smoker   • Smokeless tobacco: Never Used   • Alcohol use No   • Drug use: No   • Sexual activity: Not on file     Other Topics Concern   • Not on file     Social History Narrative   • No  narrative on file       Patient Past History:  Past Medical History:   Diagnosis Date   • Anesthesia     PONV   • Anxiety    • Cancer (HCC) 2014    thyroid   • Hypertension     hx of, not on any medication currently   • MS (multiple sclerosis) (HCC)    • Unspecified disorder of thyroid     cancer           NCCN Testing Criteria Met                            Yes    Physical Exam  Constitutional    There were no vitals taken for this visit.        General appearance: normal    Head Circumference:   ([place])  Eyes    Conjunctiva: normal    Pupils: reactive     ENMT    External inspection: normal    Assessment of Hearing: normal    Lips/cheeks/gums: no lesions  Neck   External exam: normal    Thyroid: no masses  Respiratory   Auscultation: clear    Cardiovascular   Auscultation: RRR   Edema : none  Chest   Breasts (exam): not done   Palpation:   GI   External exam and palpation:  Normal      Pelvic (female): not done   External exam (male):   Lymphatic   Palpation in 2 areas: normal     Musculoskeletal   Gait: normal    Digits and Nails: no lesions  Skin   Inspection: normal    Palpation: no masses                  Fibrofolliculoma: absent                  Trichodiscoma: absent                   Akrochordon: absent                   CAfe-au-lait:  absent                  Hypopigmentation: absent                  Mucosal freckling:  absent  Neurologic   Cranial nerves: intact   DTR’s: 2+       Assessment and Plan:  Patient with diagnosis of breast and thyroid cancer and family history of breast cancer\    40 Minutes (FACE TO FACE) >50% of time spent in Counseling and coordination of care discussing risks and benefits of molecular DNA testing    Ambry panel ordered

## 2019-01-15 NOTE — PROGRESS NOTES
"Prechart clinical data and referral information reviewed 1/14  Counseling and testing information prepared     \"I spent 30 minutes 1/14 from 1100 to 1130  reviewing past records, prior to  visit.\"          "

## 2019-01-17 VITALS
HEIGHT: 66 IN | WEIGHT: 213 LBS | OXYGEN SATURATION: 98 % | BODY MASS INDEX: 34.23 KG/M2 | HEART RATE: 87 BPM | RESPIRATION RATE: 16 BRPM | SYSTOLIC BLOOD PRESSURE: 126 MMHG | TEMPERATURE: 99.3 F | DIASTOLIC BLOOD PRESSURE: 80 MMHG

## 2019-01-17 VITALS
RESPIRATION RATE: 16 BRPM | DIASTOLIC BLOOD PRESSURE: 80 MMHG | TEMPERATURE: 99.3 F | HEART RATE: 87 BPM | SYSTOLIC BLOOD PRESSURE: 126 MMHG | BODY MASS INDEX: 34.37 KG/M2 | HEIGHT: 66 IN | WEIGHT: 213.85 LBS | OXYGEN SATURATION: 98 %

## 2019-01-17 ASSESSMENT — PAIN SCALES - GENERAL: PAINLEVEL: NO PAIN

## 2019-01-17 NOTE — NON-PROVIDER
Temi Howard is a 48 y.o. female here for a non-provider visit for: Lab Draws  on 1/17/2019 at 8:38 AM    Procedure Performed: Venipuncture     Anatomical site: Left Antecubital Area (AC)    Equipment used: 21g    Labs drawn: [place].    Ordering Provider: Dr. Michael Gatica    Mello By: Tanya Mena, Med Ass't   No complications.  was present the entire time the patients labs were being drawn.

## 2019-03-13 ENCOUNTER — ANESTHESIA EVENT (OUTPATIENT)
Dept: SURGERY | Facility: MEDICAL CENTER | Age: 49
End: 2019-03-13
Payer: COMMERCIAL

## 2019-03-14 ENCOUNTER — HOSPITAL ENCOUNTER (OUTPATIENT)
Facility: MEDICAL CENTER | Age: 49
End: 2019-03-14
Attending: PLASTIC SURGERY | Admitting: PLASTIC SURGERY
Payer: COMMERCIAL

## 2019-03-14 ENCOUNTER — ANESTHESIA (OUTPATIENT)
Dept: SURGERY | Facility: MEDICAL CENTER | Age: 49
End: 2019-03-14
Payer: COMMERCIAL

## 2019-03-14 VITALS
RESPIRATION RATE: 17 BRPM | OXYGEN SATURATION: 96 % | BODY MASS INDEX: 35.36 KG/M2 | DIASTOLIC BLOOD PRESSURE: 80 MMHG | HEART RATE: 75 BPM | WEIGHT: 220.02 LBS | HEIGHT: 66 IN | TEMPERATURE: 97.4 F | SYSTOLIC BLOOD PRESSURE: 132 MMHG

## 2019-03-14 DIAGNOSIS — G89.18 ACUTE POST-OPERATIVE PAIN: ICD-10-CM

## 2019-03-14 PROCEDURE — 501445 HCHG STAPLER, SKIN DISP: Performed by: PLASTIC SURGERY

## 2019-03-14 PROCEDURE — 160002 HCHG RECOVERY MINUTES (STAT): Performed by: PLASTIC SURGERY

## 2019-03-14 PROCEDURE — 160035 HCHG PACU - 1ST 60 MINS PHASE I: Performed by: PLASTIC SURGERY

## 2019-03-14 PROCEDURE — 700101 HCHG RX REV CODE 250

## 2019-03-14 PROCEDURE — 160041 HCHG SURGERY MINUTES - EA ADDL 1 MIN LEVEL 4: Performed by: PLASTIC SURGERY

## 2019-03-14 PROCEDURE — 500423 HCHG DRESSING, ABD COMBINE: Performed by: PLASTIC SURGERY

## 2019-03-14 PROCEDURE — A9270 NON-COVERED ITEM OR SERVICE: HCPCS | Performed by: PLASTIC SURGERY

## 2019-03-14 PROCEDURE — 700111 HCHG RX REV CODE 636 W/ 250 OVERRIDE (IP): Performed by: ANESTHESIOLOGY

## 2019-03-14 PROCEDURE — 160009 HCHG ANES TIME/MIN: Performed by: PLASTIC SURGERY

## 2019-03-14 PROCEDURE — 160046 HCHG PACU - 1ST 60 MINS PHASE II: Performed by: PLASTIC SURGERY

## 2019-03-14 PROCEDURE — L8600 IMPLANT BREAST SILICONE/EQ: HCPCS | Performed by: PLASTIC SURGERY

## 2019-03-14 PROCEDURE — 160029 HCHG SURGERY MINUTES - 1ST 30 MINS LEVEL 4: Performed by: PLASTIC SURGERY

## 2019-03-14 PROCEDURE — 501838 HCHG SUTURE GENERAL: Performed by: PLASTIC SURGERY

## 2019-03-14 PROCEDURE — 500054 HCHG BANDAGE, ELASTIC 6: Performed by: PLASTIC SURGERY

## 2019-03-14 PROCEDURE — 160025 RECOVERY II MINUTES (STATS): Performed by: PLASTIC SURGERY

## 2019-03-14 PROCEDURE — A9270 NON-COVERED ITEM OR SERVICE: HCPCS | Performed by: ANESTHESIOLOGY

## 2019-03-14 PROCEDURE — 160036 HCHG PACU - EA ADDL 30 MINS PHASE I: Performed by: PLASTIC SURGERY

## 2019-03-14 PROCEDURE — 700102 HCHG RX REV CODE 250 W/ 637 OVERRIDE(OP): Performed by: ANESTHESIOLOGY

## 2019-03-14 PROCEDURE — A6222 GAUZE <=16 IN NO W/SAL W/O B: HCPCS | Performed by: PLASTIC SURGERY

## 2019-03-14 PROCEDURE — A6223 GAUZE >16<=48 NO W/SAL W/O B: HCPCS | Performed by: PLASTIC SURGERY

## 2019-03-14 PROCEDURE — 160048 HCHG OR STATISTICAL LEVEL 1-5: Performed by: PLASTIC SURGERY

## 2019-03-14 PROCEDURE — 700111 HCHG RX REV CODE 636 W/ 250 OVERRIDE (IP)

## 2019-03-14 PROCEDURE — 700105 HCHG RX REV CODE 258: Performed by: ANESTHESIOLOGY

## 2019-03-14 PROCEDURE — 700101 HCHG RX REV CODE 250: Performed by: ANESTHESIOLOGY

## 2019-03-14 PROCEDURE — 700102 HCHG RX REV CODE 250 W/ 637 OVERRIDE(OP): Performed by: PLASTIC SURGERY

## 2019-03-14 DEVICE — IMPLANTABLE DEVICE: Type: IMPLANTABLE DEVICE | Site: BREAST | Status: FUNCTIONAL

## 2019-03-14 RX ORDER — OXYCODONE HCL 5 MG/5 ML
10 SOLUTION, ORAL ORAL
Status: COMPLETED | OUTPATIENT
Start: 2019-03-14 | End: 2019-03-14

## 2019-03-14 RX ORDER — ONDANSETRON 2 MG/ML
INJECTION INTRAMUSCULAR; INTRAVENOUS PRN
Status: DISCONTINUED | OUTPATIENT
Start: 2019-03-14 | End: 2019-03-14 | Stop reason: SURG

## 2019-03-14 RX ORDER — CELECOXIB 200 MG/1
200 CAPSULE ORAL ONCE
Status: COMPLETED | OUTPATIENT
Start: 2019-03-14 | End: 2019-03-14

## 2019-03-14 RX ORDER — SCOLOPAMINE TRANSDERMAL SYSTEM 1 MG/1
1 PATCH, EXTENDED RELEASE TRANSDERMAL
Status: DISCONTINUED | OUTPATIENT
Start: 2019-03-14 | End: 2019-03-14 | Stop reason: HOSPADM

## 2019-03-14 RX ORDER — SODIUM CHLORIDE, SODIUM LACTATE, POTASSIUM CHLORIDE, CALCIUM CHLORIDE 600; 310; 30; 20 MG/100ML; MG/100ML; MG/100ML; MG/100ML
INJECTION, SOLUTION INTRAVENOUS
Status: DISCONTINUED | OUTPATIENT
Start: 2019-03-14 | End: 2019-03-14 | Stop reason: SURG

## 2019-03-14 RX ORDER — DEXAMETHASONE SODIUM PHOSPHATE 4 MG/ML
INJECTION, SOLUTION INTRA-ARTICULAR; INTRALESIONAL; INTRAMUSCULAR; INTRAVENOUS; SOFT TISSUE PRN
Status: DISCONTINUED | OUTPATIENT
Start: 2019-03-14 | End: 2019-03-14 | Stop reason: SURG

## 2019-03-14 RX ORDER — HALOPERIDOL 5 MG/ML
1 INJECTION INTRAMUSCULAR
Status: DISCONTINUED | OUTPATIENT
Start: 2019-03-14 | End: 2019-03-14 | Stop reason: HOSPADM

## 2019-03-14 RX ORDER — CEPHALEXIN 500 MG/1
500 CAPSULE ORAL 3 TIMES DAILY
Qty: 15 CAP | Refills: 0 | Status: SHIPPED | OUTPATIENT
Start: 2019-03-14 | End: 2020-07-14

## 2019-03-14 RX ORDER — MIDAZOLAM HYDROCHLORIDE 1 MG/ML
1 INJECTION INTRAMUSCULAR; INTRAVENOUS
Status: DISCONTINUED | OUTPATIENT
Start: 2019-03-14 | End: 2019-03-14 | Stop reason: HOSPADM

## 2019-03-14 RX ORDER — ONDANSETRON 2 MG/ML
4 INJECTION INTRAMUSCULAR; INTRAVENOUS
Status: DISCONTINUED | OUTPATIENT
Start: 2019-03-14 | End: 2019-03-14 | Stop reason: HOSPADM

## 2019-03-14 RX ORDER — SODIUM CHLORIDE, SODIUM LACTATE, POTASSIUM CHLORIDE, CALCIUM CHLORIDE 600; 310; 30; 20 MG/100ML; MG/100ML; MG/100ML; MG/100ML
INJECTION, SOLUTION INTRAVENOUS CONTINUOUS
Status: DISCONTINUED | OUTPATIENT
Start: 2019-03-14 | End: 2019-03-14 | Stop reason: HOSPADM

## 2019-03-14 RX ORDER — MEPERIDINE HYDROCHLORIDE 25 MG/ML
6.25 INJECTION INTRAMUSCULAR; INTRAVENOUS; SUBCUTANEOUS
Status: DISCONTINUED | OUTPATIENT
Start: 2019-03-14 | End: 2019-03-14 | Stop reason: HOSPADM

## 2019-03-14 RX ORDER — CEFAZOLIN SODIUM 1 G/3ML
INJECTION, POWDER, FOR SOLUTION INTRAMUSCULAR; INTRAVENOUS PRN
Status: DISCONTINUED | OUTPATIENT
Start: 2019-03-14 | End: 2019-03-14 | Stop reason: SURG

## 2019-03-14 RX ORDER — OXYCODONE HCL 5 MG/5 ML
5 SOLUTION, ORAL ORAL
Status: COMPLETED | OUTPATIENT
Start: 2019-03-14 | End: 2019-03-14

## 2019-03-14 RX ORDER — MIDAZOLAM HYDROCHLORIDE 1 MG/ML
INJECTION INTRAMUSCULAR; INTRAVENOUS
Status: COMPLETED
Start: 2019-03-14 | End: 2019-03-14

## 2019-03-14 RX ORDER — BUPIVACAINE HYDROCHLORIDE AND EPINEPHRINE 2.5; 5 MG/ML; UG/ML
INJECTION, SOLUTION EPIDURAL; INFILTRATION; INTRACAUDAL; PERINEURAL
Status: DISCONTINUED | OUTPATIENT
Start: 2019-03-14 | End: 2019-03-14 | Stop reason: HOSPADM

## 2019-03-14 RX ORDER — GABAPENTIN 300 MG/1
300 CAPSULE ORAL ONCE
Status: COMPLETED | OUTPATIENT
Start: 2019-03-14 | End: 2019-03-14

## 2019-03-14 RX ORDER — SODIUM CHLORIDE, SODIUM LACTATE, POTASSIUM CHLORIDE, CALCIUM CHLORIDE 600; 310; 30; 20 MG/100ML; MG/100ML; MG/100ML; MG/100ML
INJECTION, SOLUTION INTRAVENOUS ONCE
Status: COMPLETED | OUTPATIENT
Start: 2019-03-14 | End: 2019-03-14

## 2019-03-14 RX ORDER — DIPHENHYDRAMINE HYDROCHLORIDE 50 MG/ML
12.5 INJECTION INTRAMUSCULAR; INTRAVENOUS
Status: DISCONTINUED | OUTPATIENT
Start: 2019-03-14 | End: 2019-03-14 | Stop reason: HOSPADM

## 2019-03-14 RX ORDER — OXYCODONE HYDROCHLORIDE AND ACETAMINOPHEN 5; 325 MG/1; MG/1
1-2 TABLET ORAL EVERY 4 HOURS PRN
Qty: 30 TAB | Refills: 0 | Status: SHIPPED | OUTPATIENT
Start: 2019-03-14 | End: 2019-03-21

## 2019-03-14 RX ADMIN — MIDAZOLAM HYDROCHLORIDE 2 MG: 1 INJECTION, SOLUTION INTRAMUSCULAR; INTRAVENOUS at 07:55

## 2019-03-14 RX ADMIN — DEXAMETHASONE SODIUM PHOSPHATE 8 MG: 4 INJECTION, SOLUTION INTRAMUSCULAR; INTRAVENOUS at 08:14

## 2019-03-14 RX ADMIN — FENTANYL CITRATE 25 MCG: 50 INJECTION, SOLUTION INTRAMUSCULAR; INTRAVENOUS at 09:24

## 2019-03-14 RX ADMIN — ONDANSETRON 4 MG: 2 INJECTION INTRAMUSCULAR; INTRAVENOUS at 08:29

## 2019-03-14 RX ADMIN — MIDAZOLAM HYDROCHLORIDE 0.5 MG: 1 INJECTION INTRAMUSCULAR; INTRAVENOUS at 09:05

## 2019-03-14 RX ADMIN — PROPOFOL 170 MG: 10 INJECTION, EMULSION INTRAVENOUS at 08:04

## 2019-03-14 RX ADMIN — SCOPOLAMINE 1 PATCH: 1 PATCH, EXTENDED RELEASE TRANSDERMAL at 07:07

## 2019-03-14 RX ADMIN — SODIUM CHLORIDE, POTASSIUM CHLORIDE, SODIUM LACTATE AND CALCIUM CHLORIDE: 600; 310; 30; 20 INJECTION, SOLUTION INTRAVENOUS at 08:01

## 2019-03-14 RX ADMIN — FENTANYL CITRATE 50 MCG: 50 INJECTION, SOLUTION INTRAMUSCULAR; INTRAVENOUS at 08:20

## 2019-03-14 RX ADMIN — MIDAZOLAM HYDROCHLORIDE 0.5 MG: 1 INJECTION, SOLUTION INTRAMUSCULAR; INTRAVENOUS at 09:05

## 2019-03-14 RX ADMIN — FENTANYL CITRATE 50 MCG: 50 INJECTION, SOLUTION INTRAMUSCULAR; INTRAVENOUS at 08:04

## 2019-03-14 RX ADMIN — CEFAZOLIN 2 G: 330 INJECTION, POWDER, FOR SOLUTION INTRAMUSCULAR; INTRAVENOUS at 08:12

## 2019-03-14 RX ADMIN — GABAPENTIN 300 MG: 300 CAPSULE ORAL at 07:08

## 2019-03-14 RX ADMIN — CELECOXIB 200 MG: 200 CAPSULE ORAL at 07:07

## 2019-03-14 RX ADMIN — OXYCODONE HYDROCHLORIDE 10 MG: 5 SOLUTION ORAL at 09:25

## 2019-03-14 RX ADMIN — SODIUM CHLORIDE, SODIUM LACTATE, POTASSIUM CHLORIDE, CALCIUM CHLORIDE: 600; 310; 30; 20 INJECTION, SOLUTION INTRAVENOUS at 07:00

## 2019-03-14 ASSESSMENT — PAIN SCALES - GENERAL: PAIN_LEVEL: 0

## 2019-03-14 NOTE — DISCHARGE INSTRUCTIONS
ACTIVITY: Rest and take it easy for the first 24 hours.  A responsible adult is recommended to remain with you during that time.  It is normal to feel sleepy.  We encourage you to not do anything that requires balance, judgment or coordination.    MILD FLU-LIKE SYMPTOMS ARE NORMAL. YOU MAY EXPERIENCE GENERALIZED MUSCLE ACHES, THROAT IRRITATION, HEADACHE AND/OR SOME NAUSEA.    FOR 24 HOURS DO NOT:  Drive, operate machinery or run household appliances.  Drink beer or alcoholic beverages.   Make important decisions or sign legal documents.    SPECIAL INSTRUCTIONS: *  Ace wrap on for 48 hours then the patient can DC the wrap and wear a sports bra   OK to shower in 48 hours   FU Tuesday of next week**    DIET: To avoid nausea, slowly advance diet as tolerated, avoiding spicy or greasy foods for the first day.  Add more substantial food to your diet according to your physician's instructions.  Babies can be fed formula or breast milk as soon as they are hungry.  INCREASE FLUIDS AND FIBER TO AVOID CONSTIPATION.    SURGICAL DRESSING/BATHING: *Ok to shower in 48 hours. Ace wrap for 48 hours then the patient can remove the wrap and wear a sports bra. **    FOLLOW-UP APPOINTMENT:  A follow-up appointment should be arranged with your doctor next week as scheduled.     You should CALL YOUR PHYSICIAN if you develop:  Fever greater than 101 degrees F.  Pain not relieved by medication, or persistent nausea or vomiting.  Excessive bleeding (blood soaking through dressing) or unexpected drainage from the wound.  Extreme redness or swelling around the incision site, drainage of pus or foul smelling drainage.  Inability to urinate or empty your bladder within 8 hours.  Problems with breathing or chest pain.    You should call 911 if you develop problems with breathing or chest pain.  If you are unable to contact your doctor or surgical center, you should go to the nearest emergency room or urgent care center.  Physician's telephone  #: *Dr Hayes [phone]**    If any questions arise, call your doctor.  If your doctor is not available, please feel free to call the Surgical Center at [phone].  The Center is open Monday through Friday from 7AM to 7PM.  You can also call the HEALTH HOTLINE open 24 hours/day, 7 days/week and speak to a nurse at [phone], or toll free at [phone].    A registered nurse may call you a few days after your surgery to see how you are doing after your procedure.    MEDICATIONS: Resume taking daily medication.  Take prescribed pain medication with food.  If no medication is prescribed, you may take non-aspirin pain medication if needed.  PAIN MEDICATION CAN BE VERY CONSTIPATING.  Take a stool softener or laxative such as senokot, pericolace, or milk of magnesia if needed.    Prescription given for **Already given to  *.  Last pain medication given at *09:25 am **.    If your physician has prescribed pain medication that includes Acetaminophen (Tylenol), do not take additional Acetaminophen (Tylenol) while taking the prescribed medication.    Depression / Suicide Risk    As you are discharged from this [facility] facility, it is important to learn how to keep safe from harming yourself.    Recognize the warning signs:  · Abrupt changes in personality, positive or negative- including increase in energy   · Giving away possessions  · Change in eating patterns- significant weight changes-  positive or negative  · Change in sleeping patterns- unable to sleep or sleeping all the time   · Unwillingness or inability to communicate  · Depression  · Unusual sadness, discouragement and loneliness  · Talk of wanting to die  · Neglect of personal appearance   · Rebelliousness- reckless behavior  · Withdrawal from people/activities they love  · Confusion- inability to concentrate     If you or a loved one observes any of these behaviors or has concerns about self-harm, here's what you can do:  · Talk about it-  your feelings and reasons for harming yourself  · Remove any means that you might use to hurt yourself (examples: pills, rope, extension cords, firearm)  · Get professional help from the community (Mental Health, Substance Abuse, psychological counseling)  · Do not be alone:Call your Safe Contact- someone whom you trust who will be there for you.  · Call your local CRISIS HOTLINE 751-7602 or 641-308-8555  · Call your local Children's Mobile Crisis Response Team [place] (194) 690-4312 or www.Socket Mobile  · Call the toll free National Suicide Prevention Hotlines   · National Suicide Prevention Lifeline 209-949-LDNA (1561)  · National Hope Line Network 800-SUICIDE (393-9207)

## 2019-03-14 NOTE — ANESTHESIA QCDR
2019 [facility] Clinical Data Registry (for Quality Improvement)     Postoperative nausea/vomiting risk protocol (Adult = 18 yrs and Pediatric 3-17 yrs)- (430 and 463)  General inhalation anesthetic (NOT TIVA) with PONV risk factors: Yes  Provision of anti-emetic therapy with at least 2 different classes of agents: Yes   Patient DID NOT receive anti-emetic therapy and reason is documented in Medical Record:  N/A    Multimodal Pain Management- (AQI59)  Patient undergoing Elective Surgery (i.e. Outpatient, or ASC, or Prescheduled Surgery prior to Hospital Admission): Yes  Use of Multimodal Pain Management, two or more drugs and/or interventions, NOT including systemic opioids: Yes   Exception: Documented allergy to multiple classes of analgesics:  N/A    PACU assessment of acute postoperative pain prior to Anesthesia Care End- Applies to Patients Age = 18- (ABG7)  Initial PACU pain score is which of the following: < 7/10  Patient unable to report pain score: N/A    Post-anesthetic transfer of care checklist/protocol to PACU/ICU- (426 and 427)  Upon conclusion of case, patient transferred to which of the following locations: PACU/Non-ICU  Use of transfer checklist/protocol: Yes  Exclusion: Service Performed in Patient Hospital Room (and thus did not require transfer): N/A    PACU Reintubation- (AQI31)  General anesthesia requiring endotracheal intubation (ETT) along with subsequent extubation in OR or PACU: No  Required reintubation in the PACU: N/A  Extubation was a planned trial documented in the medical record prior to removal of the original airway device: N/A    Unplanned admission to ICU related to anesthesia service up through end of PACU care- (MD51)  Unplanned admission to ICU (not initially anticipated at anesthesia start time): No

## 2019-03-14 NOTE — ANESTHESIA PROCEDURE NOTES
Airway  Date/Time: 3/14/2019 8:13 AM  Performed by: ESTIVEN OLIVER  Authorized by: ESTIVEN OLIVER     Location:  OR  Urgency:  Elective  Difficult Airway: No    Indications for Airway Management:  Anesthesia  Spontaneous Ventilation: present    Sedation Level:  Deep  Preoxygenated: Yes    Patient Position:  Sniffing  MILS Maintained Throughout: No    Mask Difficulty Assessment:  0 - not attempted  Final Airway Type:  Supraglottic airway  Final Supraglottic Airway:  Standard LMA  SGA Size:  4  Number of Attempts at Approach:  1  Number of Other Approaches Attempted:  0

## 2019-03-14 NOTE — H&P
Surgery Plastic History & Physical Note    Date  3/14/2019    Primary Care Physician  Kyle Gore M.D.    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 female who presented with KANDI reconstruction after a bilateral mastectomy.  She is here to have her permanent implants placed.    Past Medical History:   Diagnosis Date   • Anesthesia     PONV   • Anxiety    • Cancer (HCC) 2014    thyroid   • Hypertension     hx of, not on any medication currently   • MS (multiple sclerosis) (HCC)    • Unspecified disorder of thyroid     cancer       Past Surgical History:   Procedure Laterality Date   • MASTECTOMY Bilateral 11/12/2018    Procedure: MASTECTOMY- SIMPLE;  Surgeon: Ammy Cabrera M.D.;  Location: SURGERY SAME DAY [facility];  Service: General   • NODE BIOPSY SENTINEL Right 11/12/2018    Procedure: NODE BIOPSY SENTINEL- AXILLARY LYMPH;  Surgeon: Ammy Cabrera M.D.;  Location: SURGERY SAME DAY [facility];  Service: General   • BREAST RECONSTRUCTION Bilateral 11/12/2018    Procedure: BREAST RECONSTRUCTION;  Surgeon: Magdy Velázquez Jr., M.D.;  Location: SURGERY SAME DAY [facility];  Service: General   • TISSUE EXPANDER PLACE/REMOVE Bilateral 11/12/2018    Procedure: TISSUE EXPANDER PLACE/REMOVE;  Surgeon: Magdy Velázquez Jr., M.D.;  Location: SURGERY SAME DAY [facility];  Service: General   • MASS EXCISION GENERAL Right 6/9/2015    Procedure: MASS EXCISION GENERAL;  Surgeon: Dat Avery M.D.;  Location: [facility];  Service:    • THYROIDECTOMY  2/5/2014    Performed by Caitlin Mooney M.D. at SURGERY SAME DAY [facility]   • THYROID LOBECTOMY  1/8/2014    Performed by Caitlin Mooney M.D. at SURGERY SAME DAY [facility]   • GASTRIC SLEEVE LAPAROSCOPY  4/1/2013    Performed by John H Ganser, M.D. at [facility]   • HYSTERECTOMY LAPAROSCOPY  2008   • OTHER ORTHOPEDIC SURGERY  1989    right knee arthroscopic surgery   • OTHER   [id number]    lumps removed from both breasts multiple surgeries       Current Facility-Administered Medications   Medication Dose Route Frequency Provider Last Rate Last Dose   • scopolamine (TRANSDERM-SCOP) patch 1 Patch  1 Patch Transdermal Pre-Op Once Tee Hayes M.D.   1 Patch at 03/14/19 0707       Social History     Social History   • Marital status:      Spouse name: N/A   • Number of children: N/A   • Years of education: N/A     Occupational History   • Not on file.     Social History Main Topics   • Smoking status: Never Smoker   • Smokeless tobacco: Never Used   • Alcohol use No   • Drug use: No   • Sexual activity: Not on file     Other Topics Concern   • Not on file     Social History Narrative   • No narrative on file       Family History   Problem Relation Age of Onset   • Heart Disease Unknown    • Hypertension Unknown    • Cancer Unknown    • Stroke Unknown        Allergies  Other food    Review of Systems  Non contributory    Physical Exam    Vital Signs   see EMR                     KANDI with mild assymetry  Lungs are clear B  Heart is RR    Labs:non contributory                    Radiology:  No orders to display         Assessment/Plan:  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

## 2019-03-14 NOTE — ANESTHESIA POSTPROCEDURE EVALUATION
Patient: Temi Howard    Procedure Summary     Date:  03/14/19 Room / Location:  [facility] ROOM 26 / SURGERY SAME DAY [facility]    Anesthesia Start:  0801 Anesthesia Stop:      Procedures:       BREAST RECONSTRUCTION (Bilateral Breast)      BREAST IMPLANT - REPLACE SOFT TISSUE EXPANDERS WITH PERMANENT IMPLANTS (Bilateral Breast) Diagnosis:  (BREAST CANCER)    Surgeon:  Tee Hayes M.D. Responsible Provider:  Jennifer Guadalupe M.D.    Anesthesia Type:  general ASA Status:  2          Final Anesthesia Type: general  Last vitals  BP   NIBP: 143/68 (03/14/19 0858)    Temp 97.3       Pulse   Pulse: (!) 117 (03/14/19 0858)   Resp 16       SpO2   100 % (03/14/19 0858)      Anesthesia Post Evaluation    Patient location during evaluation: bedside  Patient participation: complete - patient participated  Level of consciousness: awake  Pain score: 0    Airway patency: patent  Anesthetic complications: no  Cardiovascular status: adequate  Respiratory status: acceptable  Hydration status: acceptable    PONV: none

## 2019-03-14 NOTE — OP REPORT
DATE OF SERVICE:  03/14/2019    PREOPERATIVE DIAGNOSIS:  Acquired absence of bilateral breasts and nipple.    POSTOPERATIVE DIAGNOSIS:  Acquired absence of bilateral breasts and nipple.    PROCEDURE PERFORMED:  1.  Removal of soft tissue expander.  2.  Capsulotomy on the left.  3.  Placement of permanent breast prosthesis.    SURGEON:  Tee Hayes MD    ASSISTANT:  Magdy Velázquez MD    ANESTHESIOLOGIST:  Jennifer Guadalupe MD    OPERATIVE INDICATIONS:  Patient is 48 years old.  She had bilateral   mastectomies.  At the time of her mastectomies, she had placement of soft   tissue expanders bilaterally.  She has been expanded.  The plan is to remove   the soft tissue expanders and replace them with 800 mL high profile silicone   gel breast implants.  The patient understands and agrees to proceed.    OPERATIVE DESCRIPTION:  After induction of general endotracheal anesthesia,   the patient's chest was prepped and draped sterilely.  Patient had some dog   ears that were marked that were excised and then incision made in a portion of   her mastectomy incision.  A skin and subcutaneous tissue flap were made   inferiorly and an incision was made in the AlloDerm.  The soft tissue expander   was punctured and removed.  The left pocket had a capsulotomy performed   laterally.  The pockets were inspected for hemostasis and they were perfectly   hemostatic.  The pockets were irrigated with both a local anesthetic   containing irrigant solution and an antibiotic containing irrigant solution.    Two 800 mL smooth round high profile implants were selected.  They were bathed   in antibiotic saline solution.  They were placed using a no-touch technique.    The patient was sat upright.  Symmetry was excellent and nothing further   needs to be done to adjust the pocket.  The incision for insertion of the   permanent prosthesis was closed in layers.  The dog ear excisions were closed   in layers.  Sterile dressings and light  compressive wrap were applied.       ____________________________________     MD FIDEL ACOSTA / BLANQUITA    DD:  03/14/2019 08:58:39  DT:  03/14/2019 11:32:47    D#:  [id number]  Job#:  [id number]

## 2019-03-14 NOTE — OR SURGEON
Immediate Post OP Note    PreOp Diagnosis: acquired absence of bilateral breast and nipple  PostOp Diagnosis: same    Procedure(s):  BREAST RECONSTRUCTION - Wound Class: Clean  BREAST IMPLANT - REPLACE SOFT TISSUE EXPANDERS WITH PERMANENT IMPLANTS - Wound Class: Clean    Surgeon(s):  JOE Olson Jr., M.D.    Anesthesiologist/Type of Anesthesia:  Anesthesiologist: Jennifer Guadalupe M.D./General    Surgical Staff:  Circulator: Amalia Baker R.N.; Jessie Voss R.N.  Scrub Person: Blair España    Specimens removed if any:  none    Estimated Blood Loss: none    Findings: no problems noticed    Complications:none      3/14/2019 8:54 AM Tee Hayes M.D.

## 2019-03-14 NOTE — ANESTHESIA PREPROCEDURE EVALUATION
Relevant Problems   No relevant active problems       Physical Exam    Airway   Mallampati: II  TM distance: >3 FB  Neck ROM: full       Cardiovascular   Rhythm: regular  Rate: normal     Dental - normal exam         Pulmonary   Breath sounds clear to auscultation     Abdominal   (+) obese     Neurological              Anesthesia Plan    ASA 2       Plan - general       Airway plan will be LMA        Induction: intravenous    Postoperative Plan: Postoperative administration of opioids is intended.        Informed Consent:    Anesthetic plan and risks discussed with patient.

## 2019-03-14 NOTE — ANESTHESIA TIME REPORT
Times      Start Time                 End Time                     #                          Name                                            Premium Reason  Non-Premium     Anesthesia Start and Stop Event Times     Date Time Event    3/14/2019 0801 Anesthesia Start     0901 Anesthesia Stop

## 2019-03-14 NOTE — OR NURSING
0854 Patient arrived from OR on [facility]. Report received from RN and Anesthesia. Patient's VS WNL, patient arousable, stable, breathing even/non labored.   0905 Pt very anxious, Dr. Guadalupe at bedside, instructed RN to give 0.5 mg of versed first, may give another dose if needed.   0914 Pt resting.  now at bedside.  already took prescriptions down to [facility].   0928 Pt starting to c/o of pain 7/10. Medicated per MAR.   1014 Pt ambulated to bathroom, voided.  1104 Pt verbalized readiness for discharge, d/c instructions reviewed with pt and , copy given.  1107 Pt had one emesis episode after getting dressed, queaseease given to pt. Pt refusing zofran, said she feels better now and wants to go home.   1114 D/c criteria met, PIV out, d/cd via wheelchair. Belongings with patient.

## 2019-07-10 ENCOUNTER — OFFICE VISIT (OUTPATIENT)
Dept: NEUROLOGY | Facility: MEDICAL CENTER | Age: 49
End: 2019-07-10
Payer: COMMERCIAL

## 2019-07-10 VITALS
TEMPERATURE: 97.7 F | HEART RATE: 77 BPM | OXYGEN SATURATION: 98 % | DIASTOLIC BLOOD PRESSURE: 84 MMHG | SYSTOLIC BLOOD PRESSURE: 140 MMHG | HEIGHT: 66 IN | BODY MASS INDEX: 35.03 KG/M2 | WEIGHT: 218 LBS

## 2019-07-10 DIAGNOSIS — G35 MULTIPLE SCLEROSIS (HCC): ICD-10-CM

## 2019-07-10 DIAGNOSIS — F41.9 ANXIETY: ICD-10-CM

## 2019-07-10 PROCEDURE — 99205 OFFICE O/P NEW HI 60 MIN: CPT | Performed by: PSYCHIATRY & NEUROLOGY

## 2019-07-10 RX ORDER — GLATIRAMER 40 MG/ML
40 INJECTION, SOLUTION SUBCUTANEOUS
Qty: 12 SYRINGE | Refills: 11 | Status: SHIPPED | OUTPATIENT
Start: 2019-07-10 | End: 2020-07-09

## 2019-07-10 RX ORDER — DIAZEPAM 5 MG/1
5 TABLET ORAL EVERY 6 HOURS PRN
Qty: 2 TAB | Refills: 0 | Status: SHIPPED | OUTPATIENT
Start: 2019-07-10 | End: 2019-08-09

## 2019-07-10 ASSESSMENT — PATIENT HEALTH QUESTIONNAIRE - PHQ9: CLINICAL INTERPRETATION OF PHQ2 SCORE: 0

## 2019-07-12 PROBLEM — G35 MULTIPLE SCLEROSIS (HCC): Status: ACTIVE | Noted: 2019-07-12

## 2019-07-26 ENCOUNTER — APPOINTMENT (OUTPATIENT)
Dept: RADIOLOGY | Facility: MEDICAL CENTER | Age: 49
End: 2019-07-26
Attending: PSYCHIATRY & NEUROLOGY
Payer: COMMERCIAL

## 2019-07-26 DIAGNOSIS — G35 MULTIPLE SCLEROSIS (HCC): ICD-10-CM

## 2019-07-26 PROCEDURE — 72157 MRI CHEST SPINE W/O & W/DYE: CPT

## 2019-07-26 PROCEDURE — 700117 HCHG RX CONTRAST REV CODE 255: Performed by: PSYCHIATRY & NEUROLOGY

## 2019-07-26 PROCEDURE — 72156 MRI NECK SPINE W/O & W/DYE: CPT

## 2019-07-26 PROCEDURE — A9585 GADOBUTROL INJECTION: HCPCS | Performed by: PSYCHIATRY & NEUROLOGY

## 2019-07-26 PROCEDURE — 70553 MRI BRAIN STEM W/O & W/DYE: CPT

## 2019-07-26 RX ORDER — GADOBUTROL 604.72 MG/ML
10 INJECTION INTRAVENOUS ONCE
Status: COMPLETED | OUTPATIENT
Start: 2019-07-26 | End: 2019-07-26

## 2019-07-26 RX ADMIN — GADOBUTROL 10 ML: 604.72 INJECTION INTRAVENOUS at 14:07

## 2019-09-26 ENCOUNTER — OFFICE VISIT (OUTPATIENT)
Dept: NEUROLOGY | Facility: MEDICAL CENTER | Age: 49
End: 2019-09-26
Payer: COMMERCIAL

## 2019-09-26 VITALS
WEIGHT: 215 LBS | HEIGHT: 66 IN | DIASTOLIC BLOOD PRESSURE: 86 MMHG | SYSTOLIC BLOOD PRESSURE: 148 MMHG | OXYGEN SATURATION: 98 % | HEART RATE: 94 BPM | BODY MASS INDEX: 34.55 KG/M2 | TEMPERATURE: 99 F

## 2019-09-26 DIAGNOSIS — G35 MULTIPLE SCLEROSIS (HCC): ICD-10-CM

## 2019-09-26 PROCEDURE — 99215 OFFICE O/P EST HI 40 MIN: CPT | Performed by: PSYCHIATRY & NEUROLOGY

## 2019-09-26 NOTE — PROGRESS NOTES
CC: Multiple Sclerosis       HPI:    Temi Howard is a pleasant 49 y.o. female who presents today in neurologic follow up for multiple sclerosis. Last seen 7/12/19. The patient is currently being treated with Copaxone 40mg TIW. She denies any new symptoms suggestive of MS relapse since her last visit. No side effects from the medication.       MS History:  Diagnosed: Feb. 2009  Lumbar puncture:  Told this was confirmatory.   First presentation: Transverse myelitis.  Disease modifying treatment:               Current: Copaxone 40 mg subcutaneous 3 times a week.              Previous: Copaxone 20 mg.  Former or other neurologist: Dr. Quintana.      ROS:   Constitutional: No fevers or chills.  Eyes: No blurry vision or eye pain.  ENT: No dysphagia or hearing loss.  Respiratory: No cough or shortness of breath.  Cardiovascular: No chest pain or palpitations.  GI: No nausea, vomiting, or diarrhea.  : No urinary incontinence or dysuria.  Musculoskeletal: No joint swelling or arthralgias.  Skin: No skin rashes.  Neuro: No headaches, dizziness, or tremors.  Endocrine: No heat or cold intolerance. No polydipsia or polyuria.  Psych: No depression or anxiety.  Heme/Lymph: No easy bruising or swollen lymph nodes.  All other review of systems were reviewed and were negative.     Past Medical History:   Past Medical History:   Diagnosis Date   • Cancer (HCC) 2014    thyroid, breast - ductal carcinoma in situ.   • Anesthesia     PONV   • Anxiety    • Hypertension     hx of, not on any medication currently   • MS (multiple sclerosis) (HCC)    • Unspecified disorder of thyroid     cancer       Past Surgical History:   Past Surgical History:   Procedure Laterality Date   • BREAST RECONSTRUCTION Bilateral 3/14/2019    Procedure: BREAST RECONSTRUCTION;  Surgeon: Tee Hayes M.D.;  Location: SURGERY SAME DAY [facility];  Service: Plastics   • BREAST IMPLANT REVISION Bilateral 3/14/2019    Procedure: BREAST IMPLANT - REPLACE  SOFT TISSUE EXPANDERS WITH PERMANENT IMPLANTS;  Surgeon: Tee Hayes M.D.;  Location: SURGERY SAME DAY [facility];  Service: Plastics   • MASTECTOMY Bilateral 11/12/2018    Procedure: MASTECTOMY- SIMPLE;  Surgeon: Ammy Cabrera M.D.;  Location: SURGERY SAME DAY [facility];  Service: General   • NODE BIOPSY SENTINEL Right 11/12/2018    Procedure: NODE BIOPSY SENTINEL- AXILLARY LYMPH;  Surgeon: Ammy Cabrera M.D.;  Location: SURGERY SAME DAY [facility];  Service: General   • BREAST RECONSTRUCTION Bilateral 11/12/2018    Procedure: BREAST RECONSTRUCTION;  Surgeon: Magdy Velázquez Jr., M.D.;  Location: SURGERY SAME DAY [facility];  Service: General   • TISSUE EXPANDER PLACE/REMOVE Bilateral 11/12/2018    Procedure: TISSUE EXPANDER PLACE/REMOVE;  Surgeon: Magdy Velázquez Jr., M.D.;  Location: SURGERY SAME DAY [facility];  Service: General   • MASS EXCISION GENERAL Right 6/9/2015    Procedure: MASS EXCISION GENERAL;  Surgeon: Dat Avery M.D.;  Location: SURGERY [facility];  Service:    • THYROIDECTOMY  2/5/2014    Performed by Caitlin Mooney M.D. at SURGERY SAME DAY [facility]   • THYROID LOBECTOMY  1/8/2014    Performed by Caitlin Mooney M.D. at SURGERY SAME DAY [facility]   • GASTRIC SLEEVE LAPAROSCOPY  4/1/2013    Performed by John H Ganser, M.D. at [facility]   • HYSTERECTOMY LAPAROSCOPY  2008   • OTHER ORTHOPEDIC SURGERY  1989    right knee arthroscopic surgery   • OTHER  [id number]    lumps removed from both breasts multiple surgeries       Social History:   Social History     Socioeconomic History   • Marital status:      Spouse name: Not on file   • Number of children: Not on file   • Years of education: Not on file   • Highest education level: Not on file   Occupational History   • Not on file   Social Needs   • Financial resource strain: Not on file   • Food insecurity:     Worry: Not on file     Inability: Not on file   • Transportation needs:      Medical: Not on file     Non-medical: Not on file   Tobacco Use   • Smoking status: Never Smoker   • Smokeless tobacco: Never Used   Substance and Sexual Activity   • Alcohol use: No   • Drug use: No   • Sexual activity: Yes     Partners: Male   Lifestyle   • Physical activity:     Days per week: Not on file     Minutes per session: Not on file   • Stress: Not on file   Relationships   • Social connections:     Talks on phone: Not on file     Gets together: Not on file     Attends Orthodox service: Not on file     Active member of club or organization: Not on file     Attends meetings of clubs or organizations: Not on file     Relationship status: Not on file   • Intimate partner violence:     Fear of current or ex partner: Not on file     Emotionally abused: Not on file     Physically abused: Not on file     Forced sexual activity: Not on file   Other Topics Concern   • Not on file   Social History Narrative   • Not on file       Family Hx:   Family History   Problem Relation Age of Onset   • Heart Disease Other    • Hypertension Other    • Cancer Other    • Stroke Other    • Cancer Mother         Breast   • Autoimmune Disease Mother         Sjogren's   • Heart Disease Father    • No Known Problems Brother    • No Known Problems Son    • No Known Problems Son        Current Medications:   Current Outpatient Medications:   •  Glatiramer Acetate (COPAXONE) 40 MG/ML Solution Prefilled Syringe, Inject 40 mg as instructed 3 times a week., Disp: 12 Syringe, Rfl: 11  •  Multiple Vitamins-Minerals (AIRBORNE PO), Take  by mouth., Disp: , Rfl:   •  ascorbic acid (ASCORBIC ACID) 500 MG Tab, Take 500 mg by mouth every day., Disp: , Rfl:   •  levothyroxine (SYNTHROID) 150 MCG TABS, Take 112 mcg by mouth Every morning on an empty stomach., Disp: , Rfl:   •  Cholecalciferol (VITAMIN D-3 PO), Take 4,000 Units by mouth every day., Disp: , Rfl:   •  Calcium-Magnesium-Vitamin D (CALCIUM 500 PO), Take 1,000 mg by mouth every  day., Disp: , Rfl:   •  Omega 3-6-9 Fatty Acids (OMEGA-3-6-9 PO), Take 2 Caps by mouth every day. Pt unsure of dose, Disp: , Rfl:   •  Homeopathic Products (ZICAM COLD REMEDY PO), Take  by mouth., Disp: , Rfl:   •  cephALEXin (KEFLEX) 500 MG Cap, Take 1 Cap by mouth 3 times a day. (Patient not taking: Reported on 7/10/2019), Disp: 15 Cap, Rfl: 0  •  ondansetron (ZOFRAN) 4 MG Tab tablet, Take 1 Tab by mouth every 6 hours as needed for Nausea/Vomiting. (Patient not taking: Reported on 7/10/2019), Disp: 6 Tab, Rfl: 2  •  Glatiramer Acetate (COPAXONE) 40 MG/ML SOSY, Inject  as instructed. 3 x per week, Disp: , Rfl:   •  Multiple Vitamins-Minerals (MULTIVITAMIN PO), Take 1 Tab by mouth every day., Disp: , Rfl:     Allergies:   Allergies   Allergen Reactions   • Other Food      WALNUTS-sores in mouth         Physical Exam:   Vitals:    09/26/19 1131   BP: 148/86   Pulse: 94   Temp: 37.2 °C (99 °F)   SpO2: 98%       Constitutional: Well-developed, well-nourished, good hygiene. Appears stated age.  Cardiovascular: RRR, with no murmurs, rubs or gallops. No carotid bruits. No peripheral edema, pedal pulses intact.   Respiratory: Lungs CTA B/L, no W/R/R.   Skin: Warm, dry, intact. No rashes observed.  Eyes: Sclera anicteric.  Neurologic:   Mental Status: Awake, alert, oriented x 3.   Speech: Fluent with normal prosody.   Memory: Able to recall recent and remote events accurately.    Concentration: Attentive. Able to focus on history and follow multi-step commands.   Fund of Knowledge: Appropriate.   Cranial Nerves:    CN II: PERRL. No afferent pupillary defect.    CN III, IV, VI: Good eye closure, EOMI.     CN V: Facial sensation intact and symmetric.     CN VII: No facial asymmetry.     CN VIII: Hearing intact.     CN IX and X: Palate elevates symmetrically. Normal gag reflex.    CN XI: Symmetric shoulder shrug.     CN XII: Tongue midline.    Sensory: Intact light touch, vibration and temperature.    Coordination: No evidence  "of past-pointing on finger to nose testing, no dysdiadochokinesia. Heel to shin intact.    DTR's: 2+ throughout without clonus.    Babinski: Toes downgoing bilaterally.   Romberg: Negative.   Movements: No tremors observed.   Musculoskeletal:    Strength: 5/5 in upper and lower extremities bilaterally.   Gait: Steady, narrow based.    Tone: Normal bulk and tone.   Joints: No swelling.       Imaging:   Today I reviewed the patient's most recent MRI images with her in the examination room. I explained basic terminology of MRI's, verbalized my assessment, and answered her questions.     MRI Brain w/wo contrast from        1.  Subtle multifocal areas of demyelination in the cervical cord most pronounced at the C4 level unchanged from previous exam consistent with the patient's known diagnosis of multiple sclerosis.  2.  No evidence of metastatic disease to the cervical spine.       MRI C-Spine w/wo contrast from 7/26/19.  1.  Subtle multifocal areas of demyelination in the cervical cord most pronounced at the C4 level unchanged from previous exam consistent with the patient's known diagnosis of multiple sclerosis.  2.  No evidence of metastatic disease to the cervical spine.    MRI T-Spine w/wo from 7/26/19.  1. Subtle area of demyelination in the thoracic cord at the T7-8 level consistent with the patient's known diagnosis of multiple sclerosis.  2. No \"active\" areas of demyelination in the thoracic cord.  3. No evidence metastatic disease to the thoracic spine.      Assessment/Plan:  Multiple sclerosis (HCC)  Ms. 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  She has been on Copaxone since " her diagnosis, initially the 20 mg/day dose, was switched to the 40 mg 3 times a week formulation.      Today we reviewed her recent MRI imaging which shows radiographic stability.    Plan:  1.  Continue Copaxone 40 mg subcutaneous injections.   2.  Next MRI imaging in 1 year.        Greater than 50% of this 40 minute face to face follow up encounter was devoted to disease state counseling on Multiple Sclerosis and coordination of care. (see above assessment and plan for further details of discussion). This note was completed using dictation software resulting in occasional speech recognition errors.      Julita Gudino D.O., M.P.H  MS specialist.   Board Certified Neurologist.  Neurology Clerkship Director, [facility].    Neurology,  [facility].   Tel: [phone]  Fax: [phone]

## 2019-10-18 NOTE — ASSESSMENT & PLAN NOTE
Ms. 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  She has been on Copaxone since her diagnosis, initially the 20 mg/day dose, was switched to the 40 mg 3 times a week formulation.      Today we reviewed her recent MRI imaging which shows radiographic stability.    Plan:  1.  Continue Copaxone 40 mg subcutaneous injections.   2.  Next MRI imaging in 1 year.

## 2020-07-14 ENCOUNTER — OFFICE VISIT (OUTPATIENT)
Dept: NEUROLOGY | Facility: MEDICAL CENTER | Age: 50
End: 2020-07-14
Payer: COMMERCIAL

## 2020-07-14 VITALS
DIASTOLIC BLOOD PRESSURE: 86 MMHG | TEMPERATURE: 98.9 F | SYSTOLIC BLOOD PRESSURE: 144 MMHG | OXYGEN SATURATION: 96 % | HEART RATE: 81 BPM | BODY MASS INDEX: 31.18 KG/M2 | WEIGHT: 194 LBS | HEIGHT: 66 IN

## 2020-07-14 DIAGNOSIS — G35 MULTIPLE SCLEROSIS (HCC): ICD-10-CM

## 2020-07-14 PROBLEM — C50.112 MALIGNANT NEOPLASM OF CENTRAL PORTION OF LEFT BREAST IN FEMALE, ESTROGEN RECEPTOR NEGATIVE (HCC): Status: ACTIVE | Noted: 2020-07-14

## 2020-07-14 PROBLEM — C50.129: Status: ACTIVE | Noted: 2020-07-14

## 2020-07-14 PROBLEM — Z17.1: Status: ACTIVE | Noted: 2020-07-14

## 2020-07-14 PROCEDURE — 99214 OFFICE O/P EST MOD 30 MIN: CPT | Performed by: PSYCHIATRY & NEUROLOGY

## 2020-07-14 ASSESSMENT — PATIENT HEALTH QUESTIONNAIRE - PHQ9: CLINICAL INTERPRETATION OF PHQ2 SCORE: 0

## 2020-07-14 NOTE — ASSESSMENT & PLAN NOTE
Pt has been on copaxone since 2009 for the last 11 years. No symptoms. Pt is claustraphobia with MRI.

## 2020-07-16 NOTE — PROGRESS NOTES
Chief Complaint   Patient presents with   • Follow-Up     MS       Problem List Items Addressed This Visit     Multiple sclerosis (HCC)     Pt has been on copaxone since 2009 for the last 11 years. No symptoms. Pt is claustraphobia with MRI.               History of present illness:  Temi Howard 50 y.o. female presents today for MS.    Lumbar puncture: Yes    Past DMA’s:    copaxone        Current DMA regimen:      copaxone    Prior neurologists:    Dr. Gudino      Prior brain imaging: Yes    Currently employed: Yes    Past medical history:   Past Medical History:   Diagnosis Date   • Anesthesia     PONV   • Anxiety    • Cancer (HCC) 2014    thyroid, breast - ductal carcinoma in situ.   • Hypertension     hx of, not on any medication currently   • MS (multiple sclerosis) (HCC)    • Unspecified disorder of thyroid     cancer       Past surgical history:   Past Surgical History:   Procedure Laterality Date   • BREAST RECONSTRUCTION Bilateral 3/14/2019    Procedure: BREAST RECONSTRUCTION;  Surgeon: Tee Hayes M.D.;  Location: SURGERY SAME DAY [facility];  Service: Plastics   • BREAST IMPLANT REVISION Bilateral 3/14/2019    Procedure: BREAST IMPLANT - REPLACE SOFT TISSUE EXPANDERS WITH PERMANENT IMPLANTS;  Surgeon: Tee Hayes M.D.;  Location: SURGERY SAME DAY [facility];  Service: Plastics   • MASTECTOMY Bilateral 11/12/2018    Procedure: MASTECTOMY- SIMPLE;  Surgeon: Ammy Cabrera M.D.;  Location: SURGERY SAME DAY [facility];  Service: General   • NODE BIOPSY SENTINEL Right 11/12/2018    Procedure: NODE BIOPSY SENTINEL- AXILLARY LYMPH;  Surgeon: Ammy Cabrera M.D.;  Location: SURGERY SAME DAY [facility];  Service: General   • BREAST RECONSTRUCTION Bilateral 11/12/2018    Procedure: BREAST RECONSTRUCTION;  Surgeon: Magdy Velázquez Jr., M.D.;  Location: SURGERY SAME DAY [facility];  Service: General   • TISSUE EXPANDER PLACE/REMOVE Bilateral 11/12/2018    Procedure: TISSUE EXPANDER  PLACE/REMOVE;  Surgeon: Magdy Velázquez Jr., M.D.;  Location: SURGERY SAME DAY [facility];  Service: General   • MASS EXCISION GENERAL Right 6/9/2015    Procedure: MASS EXCISION GENERAL;  Surgeon: Dat Avery M.D.;  Location: SURGERY [facility];  Service:    • THYROIDECTOMY  2/5/2014    Performed by Caitlin Mooney M.D. at SURGERY SAME DAY [facility]   • THYROID LOBECTOMY  1/8/2014    Performed by Caitlin Mooney M.D. at SURGERY SAME DAY [facility]   • GASTRIC SLEEVE LAPAROSCOPY  4/1/2013    Performed by John H Ganser, M.D. at SURGERY [facility]   • HYSTERECTOMY LAPAROSCOPY  2008   • OTHER ORTHOPEDIC SURGERY  1989    right knee arthroscopic surgery   • OTHER  [id number]    lumps removed from both breasts multiple surgeries       Family history:   Family History   Problem Relation Age of Onset   • Heart Disease Other    • Hypertension Other    • Cancer Other    • Stroke Other    • Cancer Mother         Breast   • Autoimmune Disease Mother         Sjogren's   • Heart Disease Father    • No Known Problems Brother    • No Known Problems Son    • No Known Problems Son        Social history:   Social History     Socioeconomic History   • Marital status:      Spouse name: Not on file   • Number of children: Not on file   • Years of education: Not on file   • Highest education level: Not on file   Occupational History   • Not on file   Social Needs   • Financial resource strain: Not on file   • Food insecurity     Worry: Not on file     Inability: Not on file   • Transportation needs     Medical: Not on file     Non-medical: Not on file   Tobacco Use   • Smoking status: Never Smoker   • Smokeless tobacco: Never Used   Substance and Sexual Activity   • Alcohol use: No   • Drug use: No   • Sexual activity: Yes     Partners: Male   Lifestyle   • Physical activity     Days per week: Not on file     Minutes per session: Not on file   • Stress: Not on file   Relationships   • Social  connections     Talks on phone: Not on file     Gets together: Not on file     Attends Zoroastrianism service: Not on file     Active member of club or organization: Not on file     Attends meetings of clubs or organizations: Not on file     Relationship status: Not on file   • Intimate partner violence     Fear of current or ex partner: Not on file     Emotionally abused: Not on file     Physically abused: Not on file     Forced sexual activity: Not on file   Other Topics Concern   • Not on file   Social History Narrative   • Not on file       Current medications:   Current Outpatient Medications   Medication   • Multiple Vitamins-Minerals (AIRBORNE PO)   • Homeopathic Products (ZICAM COLD REMEDY PO)   • ascorbic acid (ASCORBIC ACID) 500 MG Tab   • levothyroxine (SYNTHROID) 150 MCG TABS   • Glatiramer Acetate (COPAXONE) 40 MG/ML SOSY   • Multiple Vitamins-Minerals (MULTIVITAMIN PO)   • Cholecalciferol (VITAMIN D-3 PO)   • Calcium-Magnesium-Vitamin D (CALCIUM 500 PO)   • Omega 3-6-9 Fatty Acids (OMEGA-3-6-9 PO)     No current facility-administered medications for this visit.        Medication Allergy:  Allergies   Allergen Reactions   • Other Food      WALNUTS-sores in mouth       Review of systems:   Constitutional: denies fever, night sweats, weight loss, or malaise/fatigue.   Eyes: denies acute vision change, eye pain or secretion.   Ears, Nose, Mouth, Throat: denies nasal secretion, sinus pain, nasal bleeding, difficulty swallowing, sore pain, hearing loss, tinnitus, vertigo, ear pain, acute dental problems, oral ulcers or lesions.   Endocrine: denies recent weight changes, heat or cold intolerance, neck pain or swelling, polyuria, polydypsia, polyphagia,abnormal hair growth.  Cardiovascular: denies new onset of chest pain, palpitations, syncope, lower extremity edema, cyanosis, claudication, orthopnea, or dyspnea of exertion.  Pulmonary: denies shortness of breath, new onset of cough, hemoptysis, wheezing, chest  "pain or flu-like symptoms.   GI: denies nausea, vomiting, diarrhea, GI bleeding, change in appetite, abdominal pain, and change in bowel habits.  : denies dysuria, frequency, urgency, urinary incontinence, hematuria. Heme/oncology: denies history of easy bruising or bleeding. No history of cancer. Allergy/immunology: denies hives/urticarial, no reports of runny nose or itchy eyes. Dermatologic: denies new rash, new/growing/changing skin lesions. Musculoskeletal:denies joint swelling or pain, muscle pain, neck and back pain. Neurologic: denies headaches, acute visual changes, facial droopiness, muscle weakness (focal or generalized), paresthesias, anesthesia, ataxia, change in speech or language, memory loss, abnormal movements, seizures, loss of consciousness, or episodes of confusion.   Psychiatric: denies symptoms of depression, anxiety, hallucinations, mood swings or changes, suicidal or homicidal thoughts.     Physical examination:   Vitals:    07/14/20 1111   BP: 144/86   BP Location: Left arm   Patient Position: Sitting   BP Cuff Size: Adult   Pulse: 81   Temp: 37.2 °C (98.9 °F)   TempSrc: Temporal   SpO2: 96%   Weight: 88 kg (194 lb 0.1 oz)   Height: 1.676 m (5' 6\")     General: Patient in no acute distress, pleasant and cooperative.  HEENT: Normocephalic, no signs of acute trauma.   Neck: supple, no meningeal signs or carotid bruits. There is normal range of motion. No tenderness on exam.   Chest: clear to auscultation. No cough.   CV: RRR, no murmurs.   Abdomen: soft, non distended or tender.   Skin: no signs of acute rashes or trauma.   Musculoskeletal: joints exhibit full range of motion, without any pain to palpation. There are no signs of joint or muscle swelling. There is no tenderness to deep palpation of muscles.   Psychiatric: No hallucinatory behavior. Denies symptoms of depression or suicidal ideation. Mood and affect appear normal on exam.     NEUROLOGICAL EXAM:   Mental status, orientation: " Awake, alert and fully oriented.   Speech and language: speech is clear and fluent. The patient is able to name, repeat and comprehend.   Memory: There is intact recollection of recent and remote events.   Cranial nerve exam: Pupils are 3-4 mm bilaterally and equally reactive to light and accommodation. Visual fields are intact by confrontation. Fundoscopic exam was unremarkable. There is no nystagmus on primary or secondary gaze. Intact full EOM in all directions of gaze. Face appears symmetric. Sensation in the face is intact to light touch. Uvula is midline. Palate elevates symmetrically. Tongue is midline and without any signs of tongue biting or fasciculations. Sternocleidomastoid muscles exhibit is normal strength bilaterally. Shoulder shrug is intact bilaterally.   Motor exam: Strength is 5/5 in all extremities. Tone is normal. No abnormal movements were seen on exam.   Sensory exam reveals normal sense of light touch, proprioception, vibration and pinprick in all extremities.   Deep tendon reflexes:  2+ throughout. Plantar responses are flexor. There is no clonus.   Coordination: shows a normal finger-nose-finger. Normal rapidly alternating movements.   Gait: The patient was able to get up from seated position on first attempt without requiring assistance. Found to be steady when walking. Movements were fluid with normal arm swing. The patient was able to turn without difficulties or tendency to fall.     ANCILLARY DATA REVIEWED:     Lab Data Review:  No results found for this or any previous visit (from the past 24 hour(s)).    Imaging:    HISTORY/REASON FOR EXAM: Follow-up  MS. bilateral foot numbness.        TECHNIQUE/EXAM DESCRIPTION:   MRI of the brain without and with contrast.     T1 3D TFE sagittal, T2 DRIVE turbo spin-echo axial, T1 coronal, 3D thin-section FLAIR with coronal and optional axial, sagittal reconstructions, diffusion-weighted and apparent diffusion coefficient (ADC map) axial images  "were obtained of the whole   brain. T1-weighted 3D ARMEN postcontrast axial and T1-weighted postcontrast coronal images were obtained.     The study was performed on a Siemens Skyra 3.0 [place] MRI scanner.     10 mL Gadavist contrast was administered intravenously.     COMPARISON:  None.     FINDINGS:  The calvariae are unremarkable. There are no extra-axial fluid collections. The ventricular system and basal cisterns are within normal limits. There are a few small ovoid foci of increased T2 signal intensity in the periventricular and juxtacortical   white matter. There is been no significant interval change in size and number of lesions since previous exam. There are no \"active\" enhancing lesions in the brain parenchyma. There are no mass effects or shift of midline structures. There are no   hemorrhagic lesions. The diffusion-weighted axial images show no evidence of acute cerebral infarction.     The postcontrast images show no areas of abnormal parenchymal or meningeal enhancement.     The brainstem and posterior fossa structures are unremarkable.     Vascular flow voids in the vertebrobasilar and carotid arteries, [place], and dural venous sinuses are intact.     The paranasal sinuses and mastoids in the field of view are unremarkable.     IMPRESSION:        1.  Few scattered stable periventricular and juxtacortical white matter lesions consistent with patient's known diagnosis of multiple sclerosis.     2.  No \"active\" enhancing lesions in the brain parenchyma.         Narrative & Impression         7/26/2019 1:02 PM     HISTORY/REASON FOR EXAM: Follow-up  MS. history of breast cancer. Bilateral foot numbness        TECHNIQUE/EXAM DESCRIPTION:  MRI of the cervical spine without and with contrast.     The study was performed on a Siemens Skyra 3.0 [place] MRI scanner.  T1 sagittal, T2 fast spin-echo sagittal, and gradient echo axial images were obtained of the cervical spine.  Optional additional T2 axial, " "T2 fat-suppressed sagittal and/or STIR sagittal images may also be obtained. T1 post-contrast sagittal images were   obtained of the cervical spine. Optional T1 post-contrast axial images may be obtained.     10 mL Gadavist contrast was administered intravenously.     COMPARISON: MRI scan of the cervical spine 4/18/2016     FINDINGS:  Alignment in the cervical spine is normal. Marrow signal in the vertebral bodies is within normal limits. There is no abnormal osseous enhancement or other abnormal extradural enhancement. The disc spaces are intact at all cervical levels. There are no   significant osteophytic changes. The prevertebral and paraspinous soft tissues are unremarkable.     There are no anomalies at the craniovertebral junction. The cervical spinal cord is normal in caliber and course. There are subtle multifocal areas of increased T2 signal intensity in the cervical cord most pronounced at the C4 level. This is unchanged   from previous exam. There are no \"active\" enhancing lesions in the cervical cord. There is no abnormal cord enhancement. There is no abnormal intradural extramedullary enhancement.     At C2-3 through C7-T1 there is no significant disc bulge or protrusion. The neural foramina are intact at all cervical levels. There is no congenital or acquired central spinal stenosis at any cervical level. There is no significant facet arthropathy.     IMPRESSION:        1.  Subtle multifocal areas of demyelination in the cervical cord most pronounced at the C4 level unchanged from previous exam consistent with the patient's known diagnosis of multiple sclerosis.     2.  No evidence of metastatic disease to the cervical spine.      7/26/2019  Stat-Call Report       Narrative & Impression         7/26/2019 1:03 PM     HISTORY/REASON FOR EXAM: History of  MS and breast cancer. Bilateral foot numbness.        TECHNIQUE/EXAM DESCRIPTION:  MRI of the thoracic spine without and with contrast.     The study " "was performed on a Siemens Skyra 3.0 [place] MRI scanner.  T1 sagittal, T2 fast spin-echo sagittal, and T2 axial images were obtained of the thoracic spine. T1 post-contrast fat suppressed sagittal images were obtained. Optional T1 post-contrast axial images may be obtained.     10 mL Gadavist contrast was administered intravenously.     COMPARISON:  MRI scans thoracic spine 4/18/2016     FINDINGS:  The thoracic vertebral bodies have a normal height and alignment. The intervertebral disc have a normal appearance and signal intensity. The thoracic cord has a normal caliber and course. There is subtle multifocal increased T2 signal intensity in the   thoracic cord which is slightly more conspicuous at the T7-8 level. There is no evidence of abnormal enhancement in the thoracic spinal cord.     There is no evidence of disc extrusion, central canal stenosis, or neural foraminal stenosis.     IMPRESSION:        1. Subtle area of demyelination in the thoracic cord at the T7-8 level consistent with the patient's known diagnosis of multiple sclerosis.     2. No \"active\" areas of demyelination in the thoracic cord.     3. No evidence metastatic disease to the thoracic spine.         ASSESSMENT AND PLAN:    1. Multiple sclerosis (HCC)  Continue with copaxone and I reviewed all her recnt scans and because she has no new symptoms and is claustrophobic we will do scans next year or if new symptoms develop. 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        FOLLOW-UP:   yearly    EDUCATION AND COUNSELING:  The patient/family educated on diagnosis and prognosis. They understand MS is a chronic progressive disorder for which there is currently no cure. Despite best efforts in management, the condition is likely to progress and carries significant risk for disability including physical and cognitive.   -Effectiveness, indications, and safety profile of available Disease Modifying Agents (DMA’s) reviewed. "   -Side effects of DMA’s were discussed, including: flu like symptoms, myalgias, injection site (infection, pain, bleeding), abnormal LFT’s, pancreatitis, weight changes, development of autoantibodies which may decrease effective of the medication, panic/anxiety attacks, abnormal blood counts (increase risk for bleeding, infections, cancer), increased risk for fetal complications (including congenital malformations, developmental/intellectual disability).    -The patient will require frequent follow up and monitoring.   -Laboratory monitoring every 12 months: CBC, CMP, thyroid panel, vitamin D, UA.   -Imaging of entire neuro-axis (brain and spinal cord) with MRI’s w/wo contrast, every 12-36 months.   -Patient/family counseled on medication compliance.       Melissa Bloch, MD  Clinical  of Neurology [facility].   Diplomate in Neurology.   Office: [phone]  Fax: [phone]

## 2021-06-10 ENCOUNTER — OFFICE VISIT (OUTPATIENT)
Dept: NEUROLOGY | Facility: MEDICAL CENTER | Age: 51
End: 2021-06-10
Attending: PSYCHIATRY & NEUROLOGY
Payer: COMMERCIAL

## 2021-06-10 VITALS
SYSTOLIC BLOOD PRESSURE: 126 MMHG | WEIGHT: 200.62 LBS | OXYGEN SATURATION: 97 % | DIASTOLIC BLOOD PRESSURE: 84 MMHG | HEIGHT: 66 IN | BODY MASS INDEX: 32.24 KG/M2 | TEMPERATURE: 98.5 F | HEART RATE: 76 BPM

## 2021-06-10 DIAGNOSIS — G35 MULTIPLE SCLEROSIS (HCC): ICD-10-CM

## 2021-06-10 PROCEDURE — 99214 OFFICE O/P EST MOD 30 MIN: CPT | Performed by: PSYCHIATRY & NEUROLOGY

## 2021-06-10 PROCEDURE — 99211 OFF/OP EST MAY X REQ PHY/QHP: CPT | Performed by: PSYCHIATRY & NEUROLOGY

## 2021-06-10 ASSESSMENT — PATIENT HEALTH QUESTIONNAIRE - PHQ9: CLINICAL INTERPRETATION OF PHQ2 SCORE: 0

## 2021-06-10 NOTE — PROGRESS NOTES
Chief Complaint   Patient presents with   • Follow-Up     MS       Problem List Items Addressed This Visit     Multiple sclerosis (HCC)     Patient has been on Copaxone and has occasional injection site reactions but knows how to deal with them.  Patient feels she has been stable from a neurologic standpoint over the last year.  She has questions about the Covid vaccine.  Patient states she thinks she may have had Covid very early in the pandemic but does not know for sure.  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  Overall she feels like her coordination is satisfactory.  Patient has not been aware of any recent relapses.               History of present illness:  Temi Howard 50 y.o. female presents today for MS.    Lumbar puncture: Yes    Past DMA’s:    copaxone        Current DMA regimen:      copaxone    Prior neurologists:    Dr. Gudino      Prior brain imaging: Yes    Currently employed: Yes    Past medical history:   Past Medical History:   Diagnosis Date   • Anesthesia     PONV   • Anxiety    • Cancer (HCC) 2014    thyroid, breast - ductal carcinoma in situ.   • Hypertension     hx of, not on any medication currently   • MS (multiple sclerosis) (HCC)    • Unspecified disorder of thyroid     cancer       Past surgical history:   Past Surgical History:   Procedure Laterality Date   • BREAST RECONSTRUCTION Bilateral 3/14/2019    Procedure: BREAST RECONSTRUCTION;  Surgeon: Tee Hayes M.D.;  Location: SURGERY SAME DAY [facility];  Service: Plastics   • BREAST IMPLANT REVISION Bilateral 3/14/2019    Procedure: BREAST IMPLANT - REPLACE SOFT TISSUE EXPANDERS WITH PERMANENT IMPLANTS;  Surgeon: Tee Hayes M.D.;  Location: SURGERY SAME DAY [facility];  Service: Plastics   • MASTECTOMY Bilateral 11/12/2018    Procedure: MASTECTOMY- SIMPLE;  Surgeon: Ammy Cabrera M.D.;  Location: SURGERY SAME DAY [facility];  Service: General   • NODE BIOPSY SENTINEL Right 11/12/2018     Procedure: NODE BIOPSY SENTINEL- AXILLARY LYMPH;  Surgeon: Ammy Cabrera M.D.;  Location: SURGERY SAME DAY [facility];  Service: General   • BREAST RECONSTRUCTION Bilateral 11/12/2018    Procedure: BREAST RECONSTRUCTION;  Surgeon: Magdy Velázquez Jr., M.D.;  Location: SURGERY SAME DAY [facility];  Service: General   • TISSUE EXPANDER PLACE/REMOVE Bilateral 11/12/2018    Procedure: TISSUE EXPANDER PLACE/REMOVE;  Surgeon: Magdy Velázquez Jr., M.D.;  Location: SURGERY SAME DAY [facility];  Service: General   • MASS EXCISION GENERAL Right 6/9/2015    Procedure: MASS EXCISION GENERAL;  Surgeon: Dat Avery M.D.;  Location: SURGERY [facility];  Service:    • THYROIDECTOMY  2/5/2014    Performed by Caitlin Mooney M.D. at SURGERY SAME DAY [facility]   • THYROID LOBECTOMY  1/8/2014    Performed by Caitlin Mooney M.D. at SURGERY SAME DAY [facility]   • GASTRIC SLEEVE LAPAROSCOPY  4/1/2013    Performed by John H Ganser, M.D. at SURGERY [facility]   • HYSTERECTOMY LAPAROSCOPY  2008   • OTHER ORTHOPEDIC SURGERY  1989    right knee arthroscopic surgery   • OTHER  [id number]    lumps removed from both breasts multiple surgeries       Family history:   Family History   Problem Relation Age of Onset   • Heart Disease Other    • Hypertension Other    • Cancer Other    • Stroke Other    • Cancer Mother         Breast   • Autoimmune Disease Mother         Sjogren's   • Heart Disease Father    • No Known Problems Brother    • No Known Problems Son    • No Known Problems Son        Social history:   Social History     Socioeconomic History   • Marital status:      Spouse name: Not on file   • Number of children: Not on file   • Years of education: Not on file   • Highest education level: Not on file   Occupational History   • Not on file   Tobacco Use   • Smoking status: Never Smoker   • Smokeless tobacco: Never Used   Substance and Sexual Activity   • Alcohol use: No   • Drug use: No    • Sexual activity: Yes     Partners: Male   Other Topics Concern   • Not on file   Social History Narrative   • Not on file     Social Determinants of Health     Financial Resource Strain:    • Difficulty of Paying Living Expenses:    Food Insecurity:    • Worried About Running Out of Food in the Last Year:    • Ran Out of Food in the Last Year:    Transportation Needs:    • Lack of Transportation (Medical):    • Lack of Transportation (Non-Medical):    Physical Activity:    • Days of Exercise per Week:    • Minutes of Exercise per Session:    Stress:    • Feeling of Stress :    Social Connections:    • Frequency of Communication with Friends and Family:    • Frequency of Social Gatherings with Friends and Family:    • Attends Buddhist Services:    • Active Member of Clubs or Organizations:    • Attends Club or Organization Meetings:    • Marital Status:    Intimate Partner Violence:    • Fear of Current or Ex-Partner:    • Emotionally Abused:    • Physically Abused:    • Sexually Abused:        Current medications:   Current Outpatient Medications   Medication   • Multiple Vitamins-Minerals (AIRBORNE PO)   • ascorbic acid (ASCORBIC ACID) 500 MG Tab   • levothyroxine (SYNTHROID) 150 MCG TABS   • Glatiramer Acetate (COPAXONE) 40 MG/ML SOSY   • Multiple Vitamins-Minerals (MULTIVITAMIN PO)   • Cholecalciferol (VITAMIN D-3 PO)   • Calcium-Magnesium-Vitamin D (CALCIUM 500 PO)   • Omega 3-6-9 Fatty Acids (OMEGA-3-6-9 PO)     No current facility-administered medications for this visit.       Medication Allergy:  Allergies   Allergen Reactions   • Other Food      WALNUTS-sores in mouth       Review of systems:   Constitutional: denies fever, night sweats, weight loss, or malaise/fatigue.   Eyes: denies acute vision change, eye pain or secretion.   Ears, Nose, Mouth, Throat: denies nasal secretion, sinus pain, nasal bleeding, difficulty swallowing, sore pain, hearing loss, tinnitus, vertigo, ear pain, acute dental  "problems, oral ulcers or lesions.   Endocrine: denies recent weight changes, heat or cold intolerance, neck pain or swelling, polyuria, polydypsia, polyphagia,abnormal hair growth.  Cardiovascular: denies new onset of chest pain, palpitations, syncope, lower extremity edema, cyanosis, claudication, orthopnea, or dyspnea of exertion.  Pulmonary: denies shortness of breath, new onset of cough, hemoptysis, wheezing, chest pain or flu-like symptoms.   GI: denies nausea, vomiting, diarrhea, GI bleeding, change in appetite, abdominal pain, and change in bowel habits.  : denies dysuria, frequency, urgency, urinary incontinence, hematuria. Heme/oncology: denies history of easy bruising or bleeding. No history of cancer. Allergy/immunology: denies hives/urticarial, no reports of runny nose or itchy eyes. Dermatologic: denies new rash, new/growing/changing skin lesions. Musculoskeletal:denies joint swelling or pain, muscle pain, neck and back pain. Neurologic: denies headaches, acute visual changes, facial droopiness, muscle weakness (focal or generalized), paresthesias, anesthesia, ataxia, change in speech or language, memory loss, abnormal movements, seizures, loss of consciousness, or episodes of confusion.   Psychiatric: denies symptoms of depression, anxiety, hallucinations, mood swings or changes, suicidal or homicidal thoughts.     Physical examination:   Vitals:    06/10/21 1115   BP: 126/84   BP Location: Right arm   Patient Position: Sitting   BP Cuff Size: Adult   Pulse: 76   Temp: 36.9 °C (98.5 °F)   TempSrc: Temporal   SpO2: 97%   Weight: 91 kg (200 lb 9.9 oz)   Height: 1.676 m (5' 6\")     General: Patient in no acute distress, pleasant and cooperative.  HEENT: Normocephalic, no signs of acute trauma.   Neck: supple, no meningeal signs or carotid bruits. There is normal range of motion. No tenderness on exam.   Chest: clear to auscultation. No cough.   CV: RRR, no murmurs.   Abdomen: soft, non distended or " tender.   Skin: no signs of acute rashes or trauma.   Musculoskeletal: joints exhibit full range of motion, without any pain to palpation. There are no signs of joint or muscle swelling. There is no tenderness to deep palpation of muscles.   Psychiatric: No hallucinatory behavior. Denies symptoms of depression or suicidal ideation. Mood and affect appear normal on exam.     NEUROLOGICAL EXAM:   Mental status, orientation: Awake, alert and fully oriented.   Speech and language: speech is clear and fluent. The patient is able to name, repeat and comprehend.   Memory: There is intact recollection of recent and remote events.   Cranial nerve exam: Pupils are 3-4 mm bilaterally and equally reactive to light and accommodation. Visual fields are intact by confrontation. Fundoscopic exam was unremarkable. There is no nystagmus on primary or secondary gaze. Intact full EOM in all directions of gaze. Face appears symmetric. Sensation in the face is intact to light touch. Uvula is midline. Palate elevates symmetrically. Tongue is midline and without any signs of tongue biting or fasciculations. Sternocleidomastoid muscles exhibit is normal strength bilaterally. Shoulder shrug is intact bilaterally.   Motor exam: Strength is 5/5 in all extremities. Tone is normal. No abnormal movements were seen on exam.   Sensory exam reveals normal sense of light touch, proprioception, vibration and pinprick in all extremities.   Deep tendon reflexes:  2+ throughout. Plantar responses are flexor. There is no clonus.   Coordination: shows a normal finger-nose-finger. Normal rapidly alternating movements.   Gait: The patient was able to get up from seated position on first attempt without requiring assistance. Found to be steady when walking. Movements were fluid with normal arm swing. The patient was able to turn without difficulties or tendency to fall.     ANCILLARY DATA REVIEWED:     Lab Data Review:  No results found for this or any  "previous visit (from the past 24 hour(s)).    Imaging:    HISTORY/REASON FOR EXAM: Follow-up  MS. bilateral foot numbness.        TECHNIQUE/EXAM DESCRIPTION:   MRI of the brain without and with contrast.     T1 3D TFE sagittal, T2 DRIVE turbo spin-echo axial, T1 coronal, 3D thin-section FLAIR with coronal and optional axial, sagittal reconstructions, diffusion-weighted and apparent diffusion coefficient (ADC map) axial images were obtained of the whole   brain. T1-weighted 3D ARMEN postcontrast axial and T1-weighted postcontrast coronal images were obtained.     The study was performed on a Siemens Skyra 3.0 [place] MRI scanner.     10 mL Gadavist contrast was administered intravenously.     COMPARISON:  None.     FINDINGS:  The calvariae are unremarkable. There are no extra-axial fluid collections. The ventricular system and basal cisterns are within normal limits. There are a few small ovoid foci of increased T2 signal intensity in the periventricular and juxtacortical   white matter. There is been no significant interval change in size and number of lesions since previous exam. There are no \"active\" enhancing lesions in the brain parenchyma. There are no mass effects or shift of midline structures. There are no   hemorrhagic lesions. The diffusion-weighted axial images show no evidence of acute cerebral infarction.     The postcontrast images show no areas of abnormal parenchymal or meningeal enhancement.     The brainstem and posterior fossa structures are unremarkable.     Vascular flow voids in the vertebrobasilar and carotid arteries, [place], and dural venous sinuses are intact.     The paranasal sinuses and mastoids in the field of view are unremarkable.     IMPRESSION:        1.  Few scattered stable periventricular and juxtacortical white matter lesions consistent with patient's known diagnosis of multiple sclerosis.     2.  No \"active\" enhancing lesions in the brain parenchyma.         Narrative & " "Impression         7/26/2019 1:02 PM     HISTORY/REASON FOR EXAM: Follow-up  MS. history of breast cancer. Bilateral foot numbness        TECHNIQUE/EXAM DESCRIPTION:  MRI of the cervical spine without and with contrast.     The study was performed on a Siemens Skyra 3.0 [place] MRI scanner.  T1 sagittal, T2 fast spin-echo sagittal, and gradient echo axial images were obtained of the cervical spine.  Optional additional T2 axial, T2 fat-suppressed sagittal and/or STIR sagittal images may also be obtained. T1 post-contrast sagittal images were   obtained of the cervical spine. Optional T1 post-contrast axial images may be obtained.     10 mL Gadavist contrast was administered intravenously.     COMPARISON: MRI scan of the cervical spine 4/18/2016     FINDINGS:  Alignment in the cervical spine is normal. Marrow signal in the vertebral bodies is within normal limits. There is no abnormal osseous enhancement or other abnormal extradural enhancement. The disc spaces are intact at all cervical levels. There are no   significant osteophytic changes. The prevertebral and paraspinous soft tissues are unremarkable.     There are no anomalies at the craniovertebral junction. The cervical spinal cord is normal in caliber and course. There are subtle multifocal areas of increased T2 signal intensity in the cervical cord most pronounced at the C4 level. This is unchanged   from previous exam. There are no \"active\" enhancing lesions in the cervical cord. There is no abnormal cord enhancement. There is no abnormal intradural extramedullary enhancement.     At C2-3 through C7-T1 there is no significant disc bulge or protrusion. The neural foramina are intact at all cervical levels. There is no congenital or acquired central spinal stenosis at any cervical level. There is no significant facet arthropathy.     IMPRESSION:        1.  Subtle multifocal areas of demyelination in the cervical cord most pronounced at the C4 level unchanged " "from previous exam consistent with the patient's known diagnosis of multiple sclerosis.     2.  No evidence of metastatic disease to the cervical spine.      7/26/2019  Stat-Call Report       Narrative & Impression         7/26/2019 1:03 PM     HISTORY/REASON FOR EXAM: History of  MS and breast cancer. Bilateral foot numbness.        TECHNIQUE/EXAM DESCRIPTION:  MRI of the thoracic spine without and with contrast.     The study was performed on a Siemens Skyra 3.0 [place] MRI scanner.  T1 sagittal, T2 fast spin-echo sagittal, and T2 axial images were obtained of the thoracic spine. T1 post-contrast fat suppressed sagittal images were obtained. Optional T1 post-contrast axial images may be obtained.     10 mL Gadavist contrast was administered intravenously.     COMPARISON:  MRI scans thoracic spine 4/18/2016     FINDINGS:  The thoracic vertebral bodies have a normal height and alignment. The intervertebral disc have a normal appearance and signal intensity. The thoracic cord has a normal caliber and course. There is subtle multifocal increased T2 signal intensity in the   thoracic cord which is slightly more conspicuous at the T7-8 level. There is no evidence of abnormal enhancement in the thoracic spinal cord.     There is no evidence of disc extrusion, central canal stenosis, or neural foraminal stenosis.     IMPRESSION:        1. Subtle area of demyelination in the thoracic cord at the T7-8 level consistent with the patient's known diagnosis of multiple sclerosis.     2. No \"active\" areas of demyelination in the thoracic cord.     3. No evidence metastatic disease to the thoracic spine.         ASSESSMENT AND PLAN:    1. Multiple sclerosis (HCC)  Continue with copaxone and I reviewed all her recnt scans and because she has no new symptoms and is claustrophobic we will do scans next year or if new symptoms develop. Discussed diet and exercise.we discussed the COVID-19 vaccine and its safety and my recommendation for " her to get it with her current MS treatment and low likelihood of any side effects exacerbated by her multiple sclerosis.  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     I spent 35 minutes with this patient, over fifty percent was spent counseling patient on their condition, best management practices, reviewing test results and risks and benefits of treatment.    FOLLOW-UP:   yearly    EDUCATION AND COUNSELING:  The patient/family educated on diagnosis and prognosis. They understand MS is a chronic progressive disorder for which there is currently no cure. Despite best efforts in management, the condition is likely to progress and carries significant risk for disability including physical and cognitive.   -Effectiveness, indications, and safety profile of available Disease Modifying Agents (DMA’s) reviewed.   -Side effects of DMA’s were discussed, including: flu like symptoms, myalgias, injection site (infection, pain, bleeding), abnormal LFT’s, pancreatitis, weight changes, development of autoantibodies which may decrease effective of the medication, panic/anxiety attacks, abnormal blood counts (increase risk for bleeding, infections, cancer), increased risk for fetal complications (including congenital malformations, developmental/intellectual disability).    -The patient will require frequent follow up and monitoring.   -Laboratory monitoring every 12 months: CBC, CMP, thyroid panel, vitamin D, UA.   -Imaging of entire neuro-axis (brain and spinal cord) with MRI’s w/wo contrast, every 12-36 months.   -Patient/family counseled on medication compliance.       Melissa Bloch, MD  Clinical  of Neurology [facility].   Diplomate in Neurology.   Office: [phone]  Fax: [phone]

## 2021-06-10 NOTE — ASSESSMENT & PLAN NOTE
Patient has been on Copaxone and has occasional injection site reactions but knows how to deal with them.  Patient feels she has been stable from a neurologic standpoint over the last year.  She has questions about the Covid vaccine.  Patient states she thinks she may have had Covid very early in the pandemic but does not know for sure.  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  Overall she feels like her coordination is satisfactory.  Patient has not been aware of any recent relapses.

## 2022-04-06 DIAGNOSIS — G35 MULTIPLE SCLEROSIS (HCC): ICD-10-CM

## 2022-04-06 RX ORDER — GLATIRAMER 40 MG/ML
40 INJECTION, SOLUTION SUBCUTANEOUS
Qty: 12 ML | Refills: 12 | Status: SHIPPED | OUTPATIENT
Start: 2022-04-06 | End: 2024-03-05

## 2022-04-07 ENCOUNTER — TELEPHONE (OUTPATIENT)
Dept: NEUROLOGY | Facility: MEDICAL CENTER | Age: 52
End: 2022-04-07

## 2022-04-07 NOTE — TELEPHONE ENCOUNTER
Glatiramer Acetate 40mg/ml Soln PFS    PA submitted via CMM (Key: [id number]) awaiting response TAT 24-72 hrs.  - 04/07/2022 11:10am    PA approved caseId:[id number];Status:Approved;Review Type:Prior Auth;Coverage Start Date:03/08/2022;Coverage End Date:04/07/2023, We cannot fill must fill [facility] will have liaison Prema Cortes release - 04/07/2022 3:21pm

## 2022-06-27 ENCOUNTER — OFFICE VISIT (OUTPATIENT)
Dept: NEUROLOGY | Facility: MEDICAL CENTER | Age: 52
End: 2022-06-27
Attending: PSYCHIATRY & NEUROLOGY
Payer: COMMERCIAL

## 2022-06-27 VITALS
TEMPERATURE: 98.1 F | WEIGHT: 185 LBS | SYSTOLIC BLOOD PRESSURE: 130 MMHG | HEART RATE: 76 BPM | BODY MASS INDEX: 29.73 KG/M2 | HEIGHT: 66 IN | RESPIRATION RATE: 16 BRPM | DIASTOLIC BLOOD PRESSURE: 78 MMHG | OXYGEN SATURATION: 97 %

## 2022-06-27 DIAGNOSIS — G35 MULTIPLE SCLEROSIS (HCC): ICD-10-CM

## 2022-06-27 PROCEDURE — 99215 OFFICE O/P EST HI 40 MIN: CPT | Performed by: PSYCHIATRY & NEUROLOGY

## 2022-06-27 PROCEDURE — 99212 OFFICE O/P EST SF 10 MIN: CPT | Performed by: PSYCHIATRY & NEUROLOGY

## 2022-06-27 RX ORDER — DIAZEPAM 5 MG/1
5 TABLET ORAL EVERY 6 HOURS PRN
Qty: 3 TABLET | Refills: 0 | Status: SHIPPED | OUTPATIENT
Start: 2022-06-27 | End: 2022-07-27

## 2022-06-27 ASSESSMENT — PATIENT HEALTH QUESTIONNAIRE - PHQ9: CLINICAL INTERPRETATION OF PHQ2 SCORE: 0

## 2022-06-27 ASSESSMENT — PAIN SCALES - GENERAL: PAINLEVEL: NO PAIN

## 2022-06-27 NOTE — ASSESSMENT & PLAN NOTE
Pt was off her meds for 6 weeks and she did not notice a difference. Pt does cardio and yoga regularly and she feels well. Pt states that she has no regular symptoms. Pt does not get regular headaches.  Pain-patient has been satisfactorily maintained on Copaxone through the course of her multiple sclerosis and is tolerating this medication well.

## 2022-06-28 NOTE — PROGRESS NOTES
Chief Complaint   Patient presents with   • Multiple Sclerosis       Problem List Items Addressed This Visit     Multiple sclerosis (HCC)     Pt was off her meds for 6 weeks and she did not notice a difference. Pt does cardio and yoga regularly and she feels well. Pt states that she has no regular symptoms. Pt does not get regular headaches.  Pain-patient has been satisfactorily maintained on Copaxone through the course of her multiple sclerosis and is tolerating this medication well.             Relevant Medications    diazePAM (VALIUM) 5 MG Tab    Other Relevant Orders    MR-BRAIN-WITH & W/O    MR-CERVICAL SPINE-WITH & W/O    MR-THORACIC SPINE-WITH & W/O          History of present illness:  Temi Howard 51 y.o. female presents today for treatment of multiple sclerosis.    Past medical history:   Past Medical History:   Diagnosis Date   • Anesthesia     PONV   • Anxiety    • Cancer ([facility]) 2014    thyroid, breast - ductal carcinoma in situ.   • Hypertension     hx of, not on any medication currently   • MS (multiple sclerosis) ([facility])    • Unspecified disorder of thyroid     cancer       Past surgical history:   Past Surgical History:   Procedure Laterality Date   • BREAST RECONSTRUCTION Bilateral 3/14/2019    Procedure: BREAST RECONSTRUCTION;  Surgeon: Tee Hayes M.D.;  Location: SURGERY SAME DAY [facility];  Service: Plastics   • BREAST IMPLANT REVISION Bilateral 3/14/2019    Procedure: BREAST IMPLANT - REPLACE SOFT TISSUE EXPANDERS WITH PERMANENT IMPLANTS;  Surgeon: Tee Hayes M.D.;  Location: SURGERY SAME DAY [facility];  Service: Plastics   • MASTECTOMY Bilateral 11/12/2018    Procedure: MASTECTOMY- SIMPLE;  Surgeon: Ammy Cabrera M.D.;  Location: SURGERY SAME DAY [facility];  Service: General   • NODE BIOPSY SENTINEL Right 11/12/2018    Procedure: NODE BIOPSY SENTINEL- AXILLARY LYMPH;  Surgeon: Ammy Cabrera M.D.;  Location: SURGERY SAME DAY [facility];  Service: General   • BREAST  RECONSTRUCTION Bilateral 11/12/2018    Procedure: BREAST RECONSTRUCTION;  Surgeon: Magdy Velázquez Jr., M.D.;  Location: SURGERY SAME DAY [facility];  Service: General   • TISSUE EXPANDER PLACE/REMOVE Bilateral 11/12/2018    Procedure: TISSUE EXPANDER PLACE/REMOVE;  Surgeon: Magdy Velázquez Jr., M.D.;  Location: SURGERY SAME DAY [facility];  Service: General   • MASS EXCISION GENERAL Right 6/9/2015    Procedure: MASS EXCISION GENERAL;  Surgeon: Dat Avery M.D.;  Location: SURGERY [facility];  Service:    • THYROIDECTOMY  2/5/2014    Performed by Caitlin Mooney M.D. at SURGERY SAME DAY [facility]   • THYROID LOBECTOMY  1/8/2014    Performed by Caitlin Mooney M.D. at SURGERY SAME DAY [facility]   • GASTRIC SLEEVE LAPAROSCOPY  4/1/2013    Performed by John H Ganser, M.D. at SURGERY [facility]   • HYSTERECTOMY LAPAROSCOPY  2008   • OTHER ORTHOPEDIC SURGERY  1989    right knee arthroscopic surgery   • OTHER  [id number]    lumps removed from both breasts multiple surgeries       Family history:   Family History   Problem Relation Age of Onset   • Heart Disease Other    • Hypertension Other    • Cancer Other    • Stroke Other    • Cancer Mother         Breast   • Autoimmune Disease Mother         Sjogren's   • Heart Disease Father    • No Known Problems Brother    • No Known Problems Son    • No Known Problems Son        Social history:   Social History     Socioeconomic History   • Marital status:      Spouse name: Not on file   • Number of children: Not on file   • Years of education: Not on file   • Highest education level: Not on file   Occupational History   • Not on file   Tobacco Use   • Smoking status: Never Smoker   • Smokeless tobacco: Never Used   Vaping Use   • Vaping Use: Never used   Substance and Sexual Activity   • Alcohol use: No   • Drug use: No   • Sexual activity: Yes     Partners: Male   Other Topics Concern   • Not on file   Social History Narrative   •  Not on file     Social Determinants of Health     Financial Resource Strain: Not on file   Food Insecurity: Not on file   Transportation Needs: Not on file   Physical Activity: Not on file   Stress: Not on file   Social Connections: Not on file   Intimate Partner Violence: Not on file   Housing Stability: Not on file       Current medications:   Current Outpatient Medications   Medication   • diazePAM (VALIUM) 5 MG Tab   • Glatiramer Acetate (COPAXONE) 40 MG/ML Solution Prefilled Syringe   • Multiple Vitamins-Minerals (AIRBORNE PO)   • ascorbic acid (ASCORBIC ACID) 500 MG Tab   • levothyroxine (SYNTHROID) 150 MCG Tab   • Multiple Vitamins-Minerals (MULTIVITAMIN PO)   • Cholecalciferol (VITAMIN D-3 PO)   • Calcium-Magnesium-Vitamin D (CALCIUM 500 PO)   • Omega 3-6-9 Fatty Acids (OMEGA-3-6-9 PO)     No current facility-administered medications for this visit.       Medication Allergy:  Allergies   Allergen Reactions   • Other Food      WALNUTS-sores in mouth       Review of systems:   Constitutional: denies fever, night sweats, weight loss.   Eyes: denies acute vision change, eye pain or secretion.   Ears, Nose, Mouth, Throat: denies nasal secretion, nasal bleeding, difficulty swallowing, hearing loss, tinnitus, vertigo, ear pain, acute dental problems, oral ulcers or lesions.   Endocrine: denies recent weight changes, heat or cold intolerance, polyuria, polydypsia, polyphagia,abnormal hair growth.  Cardiovascular: denies new onset of chest pain, palpitations, syncope, or dyspnea of exertion.  Pulmonary: denies shortness of breath, new onset of cough, hemoptysis, wheezing, chest pain or flu-like symptoms.   GI: denies nausea, vomiting, diarrhea, GI bleeding, change in appetite, abdominal pain, and change in bowel habits.  : denies dysuria, urinary incontinence, hematuria.  Heme/oncology: denies history of easy bruising or bleeding. No history of cancer, DVTor PE.  Allergy/immunology: denies hives/urticaria, or  "itching.   Dermatologic: denies new rash, or new skin lesions.  Musculoskeletal:denies joint swelling or pain, muscle pain, neck and back pain. Neurologic: denies headaches, acute visual changes, facial droopiness, muscle weakness (focal or generalized), paresthesias, anesthesia, ataxia, change in speech or language, memory loss, abnormal movements, seizures, loss of consciousness, or episodes of confusion.   Psychiatric: denies symptoms of depression, anxiety, hallucinations, mood swings or changes, suicidal or homicidal thoughts.     Physical examination:   Vitals:    06/27/22 1027   BP: 130/78   BP Location: Right arm   Patient Position: Sitting   BP Cuff Size: Adult   Pulse: 76   Resp: 16   Temp: 36.7 °C (98.1 °F)   TempSrc: Temporal   SpO2: 97%   Weight: 83.9 kg (185 lb)   Height: 1.676 m (5' 6\")     General: Patient in no acute distress, pleasant and cooperative.  HEENT: Normocephalic, no signs of acute trauma.   Neck: supple, no meningeal signs or carotid bruits. There is normal range of motion. No tenderness on exam.   Chest: clear to auscultation. No cough.   CV: RRR, no murmurs.   Skin: no signs of acute rashes or trauma.   Musculoskeletal: joints exhibit full range of motion, without any pain to palpation. There are no signs of joint or muscle swelling. There is no tenderness to deep palpation of muscles.   Psychiatric: No hallucinatory behavior. Denies symptoms of depression or suicidal ideation. Mood and affect appear normal on exam.     NEUROLOGICAL EXAM:   Mental status, orientation: Awake, alert and fully oriented.   Speech and language: speech is clear and fluent. The patient is able to name, repeat and comprehend.   Memory: There is intact recollection of recent and remote events.   Cranial nerve exam: Pupils are 3-4 mm bilaterally and equally reactive to light and accommodation. Visual fields are intact by confrontation. Fundoscopic exam was unremarkable. There is no nystagmus on primary or " secondary gaze. Intact full EOM in all directions of gaze. Face appears symmetric. Sensation in the face is intact to light touch. Uvula is midline. Palate elevates symmetrically. Tongue is midline and without any signs of tongue biting or fasciculations. Sternocleidomastoid muscles exhibit is normal strength bilaterally. Shoulder shrug is intact bilaterally.   Motor exam: Strength is 5/5 in all extremities. Tone is normal. No abnormal movements were seen on exam.   Sensory exam reveals normal sense of light touch, proprioception, vibration and pinprick in all extremities.   Deep tendon reflexes:  2+ throughout. Plantar responses are flexor. There is no clonus.   Coordination: shows a normal finger-nose-finger. Normal rapidly alternating movements.   Gait: The patient was able to get up from seated position on first attempt without requiring assistance. Found to be steady when walking. Movements were fluid with normal arm swing. The patient was able to turn without difficulties or tendency to fall. Romberg examination negative      ANCILLARY DATA REVIEWED:     Lab Data Review:  No results found for this or any previous visit (from the past 24 hour(s)).    Records reviewed: I reviewed all previous laboratory testing.      Imaging: I reviewed neuroimaging of the brain, cervical and thoracic scored consistent with multiple sclerosis from 2019.          ASSESSMENT AND PLAN:    1. Multiple sclerosis (HCC)  Plan to continue Copaxone is the safest therapy with her history of cancer.  We will do repeat laboratory testing to make sure she is maintaining her stability.  She will continue with her exercise and healthy diet program.  - MR-BRAIN-WITH & W/O; Future  - MR-CERVICAL SPINE-WITH & W/O; Future  - MR-THORACIC SPINE-WITH & W/O; Future  - diazePAM (VALIUM) 5 MG Tab; Take 1 Tablet by mouth every 6 hours as needed for Anxiety (for MRI scans) for up to 3 doses.  Dispense: 3 Tablet; Refill: 0        FOLLOW-UP:   Return in  about 1 year (around 6/27/2023).      My total time spent caring for the patient on the day of the encounter was 47 minutes.   This does not include time spent on separately billable procedures/tests.    EDUCATION AND COUNSELING:  -Discussed regular exercise program and prevention of cardiovascular disease, including stroke.   -Discussed healthy lifestyle, including: healthy diet (rich in fruits, vegetables, nuts and healthy oils); proper hydration, and adequate sleep hygiene (allowing 7-8 hrs of overnight sleep).        Melissa Bloch, MD  Clinical  of Neurology [facility].   Diplomate in Neurology.   Office: [phone]  Fax: [phone]

## 2023-03-27 ENCOUNTER — TELEPHONE (OUTPATIENT)
Dept: NEUROLOGY | Facility: MEDICAL CENTER | Age: 53
End: 2023-03-27
Payer: COMMERCIAL

## 2023-03-27 NOTE — TELEPHONE ENCOUNTER
[facility] called to clarify NPI [id number] MCKENZIE for dr Bloch.  Called [phone]  Ref # [id number]  Rx Copaxone.

## 2023-05-09 ENCOUNTER — PATIENT MESSAGE (OUTPATIENT)
Dept: NEUROLOGY | Facility: MEDICAL CENTER | Age: 53
End: 2023-05-09
Payer: COMMERCIAL

## 2023-05-09 NOTE — PATIENT COMMUNICATION
Glatiramer Acetate (Copaxone) 40mg/ml SOSY    Called into [facility] [phone] Rep Amelie VILLALTA approved [id number] good 04/09/2023 thru 05/09/2024 - Patient must fill @ [facility], will have liaison Prema Cortes release. - 05/09/2023 3:35pm

## 2023-07-21 ENCOUNTER — TELEPHONE (OUTPATIENT)
Dept: NEUROLOGY | Facility: MEDICAL CENTER | Age: 53
End: 2023-07-21
Payer: COMMERCIAL

## 2023-08-01 ENCOUNTER — OFFICE VISIT (OUTPATIENT)
Dept: NEUROLOGY | Facility: MEDICAL CENTER | Age: 53
End: 2023-08-01
Attending: PSYCHIATRY & NEUROLOGY
Payer: COMMERCIAL

## 2023-08-01 VITALS
BODY MASS INDEX: 29.48 KG/M2 | HEIGHT: 66 IN | WEIGHT: 183.42 LBS | HEART RATE: 82 BPM | OXYGEN SATURATION: 96 % | TEMPERATURE: 97.3 F | DIASTOLIC BLOOD PRESSURE: 76 MMHG | SYSTOLIC BLOOD PRESSURE: 118 MMHG

## 2023-08-01 DIAGNOSIS — G35 MULTIPLE SCLEROSIS (HCC): ICD-10-CM

## 2023-08-01 DIAGNOSIS — G25.0 BENIGN ESSENTIAL TREMOR: ICD-10-CM

## 2023-08-01 DIAGNOSIS — Z17.1 MALIGNANT NEOPLASM OF CENTRAL PORTION OF LEFT BREAST IN FEMALE, ESTROGEN RECEPTOR NEGATIVE (HCC): ICD-10-CM

## 2023-08-01 DIAGNOSIS — C50.112 MALIGNANT NEOPLASM OF CENTRAL PORTION OF LEFT BREAST IN FEMALE, ESTROGEN RECEPTOR NEGATIVE (HCC): ICD-10-CM

## 2023-08-01 PROCEDURE — 99214 OFFICE O/P EST MOD 30 MIN: CPT | Performed by: PSYCHIATRY & NEUROLOGY

## 2023-08-01 PROCEDURE — 99212 OFFICE O/P EST SF 10 MIN: CPT | Performed by: PSYCHIATRY & NEUROLOGY

## 2023-08-01 PROCEDURE — 3074F SYST BP LT 130 MM HG: CPT | Performed by: PSYCHIATRY & NEUROLOGY

## 2023-08-01 PROCEDURE — 3078F DIAST BP <80 MM HG: CPT | Performed by: PSYCHIATRY & NEUROLOGY

## 2023-08-01 RX ORDER — LISINOPRIL 5 MG/1
5 TABLET ORAL DAILY
COMMUNITY

## 2023-08-01 ASSESSMENT — PATIENT HEALTH QUESTIONNAIRE - PHQ9: CLINICAL INTERPRETATION OF PHQ2 SCORE: 0

## 2023-08-01 NOTE — ASSESSMENT & PLAN NOTE
Pt does 30-40 minutes of cardio a day and she walks daily. Pt has been sleeping better as she has gotten used to menopause.

## 2023-08-02 NOTE — PROGRESS NOTES
Chief Complaint   Patient presents with    Follow-Up     MS       Problem List Items Addressed This Visit       Multiple sclerosis (HCC)     Pt does 30-40 minutes of cardio a day and she walks daily. Pt has been sleeping better as she has gotten used to menopause.         Malignant neoplasm of central portion of left breast in female, estrogen receptor negative (HCC)     Pt had bilateral mastectomy when she was 48.         Benign essential tremor     Pt states that she has more when she hands things to someone.            History of present illness:  Temi Howard 53 y.o. female presents today for sclerosis in the setting of previous malignancies.    Past medical history:   Past Medical History:   Diagnosis Date    Anesthesia     PONV    Anxiety     Cancer (HCC) 2014    thyroid, breast - ductal carcinoma in situ.    Hypertension     hx of, not on any medication currently    MS (multiple sclerosis) ([facility])     Unspecified disorder of thyroid     cancer       Past surgical history:   Past Surgical History:   Procedure Laterality Date    BREAST RECONSTRUCTION Bilateral 3/14/2019    Procedure: BREAST RECONSTRUCTION;  Surgeon: Tee Hayes M.D.;  Location: SURGERY SAME DAY [facility];  Service: Plastics    BREAST IMPLANT REVISION Bilateral 3/14/2019    Procedure: BREAST IMPLANT - REPLACE SOFT TISSUE EXPANDERS WITH PERMANENT IMPLANTS;  Surgeon: Tee Hayes M.D.;  Location: SURGERY SAME DAY [facility];  Service: Plastics    MASTECTOMY Bilateral 11/12/2018    Procedure: MASTECTOMY- SIMPLE;  Surgeon: Ammy Cabrera M.D.;  Location: SURGERY SAME DAY [facility];  Service: General    NODE BIOPSY SENTINEL Right 11/12/2018    Procedure: NODE BIOPSY SENTINEL- AXILLARY LYMPH;  Surgeon: Ammy Cabrera M.D.;  Location: SURGERY SAME DAY [facility];  Service: General    BREAST RECONSTRUCTION Bilateral 11/12/2018    Procedure: BREAST RECONSTRUCTION;  Surgeon: Magdy Velázquez Jr., M.D.;  Location: SURGERY SAME DAY  [facility];  Service: General    TISSUE EXPANDER PLACE/REMOVE Bilateral 11/12/2018    Procedure: TISSUE EXPANDER PLACE/REMOVE;  Surgeon: Magdy Velázquez Jr., M.D.;  Location: SURGERY SAME DAY [facility];  Service: General    MASS EXCISION GENERAL Right 6/9/2015    Procedure: MASS EXCISION GENERAL;  Surgeon: Dat Avery M.D.;  Location: SURGERY [facility];  Service:     THYROIDECTOMY  2/5/2014    Performed by Caitlin Mooney M.D. at SURGERY SAME DAY [facility]    THYROID LOBECTOMY  1/8/2014    Performed by Caitlin Mooney M.D. at SURGERY SAME DAY [facility]    GASTRIC SLEEVE LAPAROSCOPY  4/1/2013    Performed by John H Ganser, M.D. at SURGERY [facility]    HYSTERECTOMY LAPAROSCOPY  2008    OTHER ORTHOPEDIC SURGERY  1989    right knee arthroscopic surgery    OTHER  [id number]    lumps removed from both breasts multiple surgeries       Family history:   Family History   Problem Relation Age of Onset    Heart Disease Other     Hypertension Other     Cancer Other     Stroke Other     Cancer Mother         Breast    Autoimmune Disease Mother         Sjogren's    Heart Disease Father     No Known Problems Brother     No Known Problems Son     No Known Problems Son        Social history:   Social History     Socioeconomic History    Marital status:      Spouse name: Not on file    Number of children: Not on file    Years of education: Not on file    Highest education level: Not on file   Occupational History    Not on file   Tobacco Use    Smoking status: Never    Smokeless tobacco: Never   Vaping Use    Vaping Use: Never used   Substance and Sexual Activity    Alcohol use: No    Drug use: No    Sexual activity: Yes     Partners: Male   Other Topics Concern    Not on file   Social History Narrative    Not on file     Social Determinants of Health     Financial Resource Strain: Not on file   Food Insecurity: Not on file   Transportation Needs: Not on file   Physical Activity: Not on  file   Stress: Not on file   Social Connections: Not on file   Intimate Partner Violence: Not on file   Housing Stability: Not on file       Current medications:   Current Outpatient Medications   Medication    lisinopril (PRINIVIL) 5 MG Tab    Glatiramer Acetate (COPAXONE) 40 MG/ML Solution Prefilled Syringe    Multiple Vitamins-Minerals (AIRBORNE PO)    ascorbic acid (ASCORBIC ACID) 500 MG Tab    levothyroxine (SYNTHROID) 150 MCG Tab    Multiple Vitamins-Minerals (MULTIVITAMIN PO)    Cholecalciferol (VITAMIN D-3 PO)    Calcium-Magnesium-Vitamin D (CALCIUM 500 PO)    Omega 3-6-9 Fatty Acids (OMEGA-3-6-9 PO)     No current facility-administered medications for this visit.       Medication Allergy:  Allergies   Allergen Reactions    Other Food      WALNUTS-sores in mouth       Review of systems:   Constitutional: denies fever, night sweats, weight loss.   Eyes: denies acute vision change, eye pain or secretion.   Ears, Nose, Mouth, Throat: denies nasal secretion, nasal bleeding, difficulty swallowing, hearing loss, tinnitus, vertigo, ear pain, acute dental problems, oral ulcers or lesions.   Endocrine: denies recent weight changes, heat or cold intolerance, polyuria, polydypsia, polyphagia,abnormal hair growth.  Cardiovascular: denies new onset of chest pain, palpitations, syncope, or dyspnea of exertion.  Pulmonary: denies shortness of breath, new onset of cough, hemoptysis, wheezing, chest pain or flu-like symptoms.   GI: denies nausea, vomiting, diarrhea, GI bleeding, change in appetite, abdominal pain, and change in bowel habits.  : denies dysuria, urinary incontinence, hematuria.  Heme/oncology: denies history of easy bruising or bleeding. No history of cancer, DVTor PE.  Allergy/immunology: denies hives/urticaria, or itching.   Dermatologic: denies new rash, or new skin lesions.  Musculoskeletal:denies joint swelling or pain, muscle pain, neck and back pain. Neurologic: denies headaches, acute visual changes,  "facial droopiness, muscle weakness (focal or generalized), paresthesias, anesthesia, ataxia, change in speech or language, memory loss, abnormal movements, seizures, loss of consciousness, or episodes of confusion.   Psychiatric: denies symptoms of depression, anxiety, hallucinations, mood swings or changes, suicidal or homicidal thoughts.     Physical examination:   Vitals:    08/01/23 1251   BP: 118/76   BP Location: Right arm   Patient Position: Sitting   BP Cuff Size: Adult   Pulse: 82   Temp: 36.3 °C (97.3 °F)   TempSrc: Temporal   SpO2: 96%   Weight: 83.2 kg (183 lb 6.8 oz)   Height: 1.676 m (5' 6\")     General: Patient in no acute distress, pleasant and cooperative.  HEENT: Normocephalic, no signs of acute trauma.   Neck: supple, no meningeal signs or carotid bruits. There is normal range of motion. No tenderness on exam.   Chest: clear to auscultation. No cough.   CV: RRR, no murmurs.   Skin: no signs of acute rashes or trauma.   Musculoskeletal: joints exhibit full range of motion, without any pain to palpation. There are no signs of joint or muscle swelling. There is no tenderness to deep palpation of muscles.   Psychiatric: No hallucinatory behavior. Denies symptoms of depression or suicidal ideation. Mood and affect appear normal on exam.     NEUROLOGICAL EXAM:   Mental status, orientation: Awake, alert and fully oriented.   Speech and language: speech is clear and fluent. The patient is able to name, repeat and comprehend.   Memory: There is intact recollection of recent and remote events.   Cranial nerve exam: Pupils are 3-4 mm bilaterally and equally reactive to light and accommodation. Visual fields are intact by confrontation. Fundoscopic exam was unremarkable. There is no nystagmus on primary or secondary gaze. Intact full EOM in all directions of gaze. Face appears symmetric. Sensation in the face is intact to light touch. Uvula is midline. Palate elevates symmetrically. Tongue is midline and " without any signs of tongue biting or fasciculations. Sternocleidomastoid muscles exhibit is normal strength bilaterally. Shoulder shrug is intact bilaterally.   Motor exam: Strength is 5/5 in all extremities. Tone is normal. No abnormal movements were seen on exam.   Sensory exam reveals normal sense of light touch, proprioception, vibration and pinprick in all extremities.   Deep tendon reflexes:  2+ throughout. Plantar responses are flexor. There is no clonus.   Coordination: shows a normal finger-nose-finger. Normal rapidly alternating movements.   Gait: The patient was able to get up from seated position on first attempt without requiring assistance. Found to be steady when walking. Movements were fluid with normal arm swing. The patient was able to turn without difficulties or tendency to fall. Romberg examination positive      ANCILLARY DATA REVIEWED:     Lab Data Review:  No results found for this or any previous visit (from the past 24 hour(s)).    Records reviewed: I reviewed all previous records.      Imaging: MRIs consistent with MS          ASSESSMENT AND PLAN:    1. Multiple sclerosis (HCC)  Plan to complete continue Copaxone as she has done well on this treatment.  Patient will continue with exercise as she tolerates.    2. Malignant neoplasm of central portion of left breast in female, estrogen receptor negative (HCC)  Patient currently is in remission.    3. Benign essential tremor  We discussed if this worsens in the future we could consider propranolol.        FOLLOW-UP:   No follow-ups on file.      My total time spent caring for the patient on the day of the encounter was 33 minutes.   This does not include time spent on separately billable procedures/tests.     EDUCATION AND COUNSELING:  -Discussed regular exercise program and prevention of cardiovascular disease, including stroke.   -Discussed healthy lifestyle, including: healthy diet (rich in fruits, vegetables, nuts and healthy oils);  proper hydration, and adequate sleep hygiene (allowing 7-8 hrs of overnight sleep).        Melissa Bloch, MD  Clinical  of Neurology [facility].   Diplomate in Neurology.   Office: [phone]  Fax: [phone]

## 2023-10-24 ENCOUNTER — APPOINTMENT (RX ONLY)
Dept: URBAN - METROPOLITAN AREA CLINIC 4 | Facility: CLINIC | Age: 53
Setting detail: DERMATOLOGY
End: 2023-10-24

## 2023-10-24 DIAGNOSIS — L82.1 OTHER SEBORRHEIC KERATOSIS: ICD-10-CM

## 2023-10-24 DIAGNOSIS — L81.4 OTHER MELANIN HYPERPIGMENTATION: ICD-10-CM

## 2023-10-24 DIAGNOSIS — D18.0 HEMANGIOMA: ICD-10-CM

## 2023-10-24 DIAGNOSIS — L65.9 NONSCARRING HAIR LOSS, UNSPECIFIED: ICD-10-CM

## 2023-10-24 DIAGNOSIS — Z71.89 OTHER SPECIFIED COUNSELING: ICD-10-CM

## 2023-10-24 PROBLEM — D18.01 HEMANGIOMA OF SKIN AND SUBCUTANEOUS TISSUE: Status: ACTIVE | Noted: 2023-10-24

## 2023-10-24 PROCEDURE — ? COUNSELING

## 2023-10-24 PROCEDURE — ? SUNSCREEN RECOMMENDATIONS

## 2023-10-24 PROCEDURE — 99203 OFFICE O/P NEW LOW 30 MIN: CPT

## 2023-10-24 ASSESSMENT — LOCATION SIMPLE DESCRIPTION DERM
LOCATION SIMPLE: SCALP
LOCATION SIMPLE: UPPER BACK

## 2023-10-24 ASSESSMENT — LOCATION DETAILED DESCRIPTION DERM
LOCATION DETAILED: SUPERIOR THORACIC SPINE
LOCATION DETAILED: RIGHT SUPERIOR PARIETAL SCALP

## 2023-10-24 ASSESSMENT — LOCATION ZONE DERM
LOCATION ZONE: TRUNK
LOCATION ZONE: SCALP

## 2023-10-24 NOTE — PROCEDURE: COUNSELING
Detail Level: Generalized
Sunscreen Recommendations: Recommend sunscreen daily, or sun protective clothing.  I recommend a zinc or titanium based sunscreen with a spf of 30 or greater.
Detail Level: Detailed

## 2023-12-19 ENCOUNTER — APPOINTMENT (RX ONLY)
Dept: URBAN - METROPOLITAN AREA CLINIC 35 | Facility: CLINIC | Age: 53
Setting detail: DERMATOLOGY
End: 2023-12-19

## 2023-12-19 DIAGNOSIS — D485 NEOPLASM OF UNCERTAIN BEHAVIOR OF SKIN: ICD-10-CM | Status: INADEQUATELY CONTROLLED

## 2023-12-19 DIAGNOSIS — L65.9 NONSCARRING HAIR LOSS, UNSPECIFIED: ICD-10-CM

## 2023-12-19 PROBLEM — D48.5 NEOPLASM OF UNCERTAIN BEHAVIOR OF SKIN: Status: ACTIVE | Noted: 2023-12-19

## 2023-12-19 PROCEDURE — ? COUNSELING

## 2023-12-19 PROCEDURE — ? BIOPSY BY PUNCH METHOD

## 2023-12-19 PROCEDURE — ? TREATMENT REGIMEN

## 2023-12-19 PROCEDURE — ? SURGICAL DECISION MAKING

## 2023-12-19 PROCEDURE — ? ADDITIONAL NOTES

## 2023-12-19 PROCEDURE — 99214 OFFICE O/P EST MOD 30 MIN: CPT | Mod: 25

## 2023-12-19 PROCEDURE — ? SEPARATE AND IDENTIFIABLE DOCUMENTATION

## 2023-12-19 PROCEDURE — 11104 PUNCH BX SKIN SINGLE LESION: CPT

## 2023-12-19 ASSESSMENT — LOCATION ZONE DERM: LOCATION ZONE: FACE

## 2023-12-19 ASSESSMENT — LOCATION DETAILED DESCRIPTION DERM: LOCATION DETAILED: LEFT SUPERIOR FOREHEAD

## 2023-12-19 ASSESSMENT — LOCATION SIMPLE DESCRIPTION DERM: LOCATION SIMPLE: LEFT FOREHEAD

## 2023-12-19 NOTE — PROCEDURE: ADDITIONAL NOTES
Additional Notes: 12/19/23 pt had lab work done at [facility]. Will request labs
Render Risk Assessment In Note?: yes
Detail Level: Zone

## 2023-12-19 NOTE — HPI: HAIR LOSS
Previous Labs: No
How Did The Hair Loss Occur?: gradual in onset
What Hair Products Do You Use?: Pureology

## 2023-12-19 NOTE — PROCEDURE: SURGICAL DECISION MAKING
Father
Complexity (Necessary For Coding; Major - 90 Day Global With Some Exceptions; Minor - 10 Day Global): minor
Initial Decision For Surgery?: Yes
Date Of Surgery - Today Or Tomorrow?: today
Risk Assessment Explanation (Does Not Render In The Note): Clinical determination of the probability and/or consequences of an event, such as surgery. Clinical assessment of the level of risk is affected by the nature of the event under consideration for the patient. Modifier 57 is used to indicate an Evaluation and Management (E/M) service resulted in the initial decision to perform surgery either the day before a major surgery (90 day global) or the day of a major surgery.
Discussion: We discussed not only the risks of the procedure but also the likely barron that further treatment will be required to treat lesion. This could involve another visit here to destroy or excise  lesion, a visit to a Mohs or general or plastic surgeon, scarring, limited  activity, cosmetic imperfections.

## 2023-12-19 NOTE — PROCEDURE: BIOPSY BY PUNCH METHOD
Detail Level: Detailed
Was A Bandage Applied: Yes
Punch Size In Mm: 4
Size Of Lesion In Cm (Optional): 0
Depth Of Punch Biopsy: dermis
Biopsy Type: H and E
Anesthesia Type: 1% lidocaine with epinephrine
Anesthesia Volume In Cc: 0.7
Hemostasis: None
Epidermal Sutures: 4-0 Nylon
Number Of Epidermal Sutures (Optional): 2
Wound Care: Petrolatum
Dressing: Band-Aid
Suture Removal: 7 days
Patient Will Remove Sutures At Home?: No
Lab: [id number]
Lab Facility: 
Consent: Verbal consent was obtained and risks were reviewed including but not limited to scarring, infection, bleeding, scabbing, incomplete removal, nerve damage and allergy to anesthesia.
Post-Care Instructions: I reviewed with the patient in detail post-care instructions.  Patient to cleanse area with mild soap and water, apply Vaseline to area x 14 days. Patient may apply hydrogen peroxide soaks to remove any crusting.
Home Suture Removal Text: Patient was provided a home suture removal kit and will remove their sutures at home.  If they have any questions or difficulties they will call the office.
Notification Instructions: Patient will be notified of biopsy results. However, patient instructed to call the office if not contacted within 2 weeks.
Billing Type: Third-Party Bill
Information: Selecting Yes will display possible errors in your note based on the variables you have selected. This validation is only offered as a suggestion for you. PLEASE NOTE THAT THE VALIDATION TEXT WILL BE REMOVED WHEN YOU FINALIZE YOUR NOTE. IF YOU WANT TO FAX A PRELIMINARY NOTE YOU WILL NEED TO TOGGLE THIS TO 'NO' IF YOU DO NOT WANT IT IN YOUR FAXED NOTE.

## 2024-01-15 ENCOUNTER — RX ONLY (OUTPATIENT)
Age: 54
Setting detail: RX ONLY
End: 2024-01-15

## 2024-01-15 RX ORDER — MINOXIDIL 2.5 MG/1
1 TABLET ORAL QD
Qty: 30 | Refills: 1 | Status: ERX | COMMUNITY
Start: 2024-01-15

## 2024-03-04 DIAGNOSIS — G35 MULTIPLE SCLEROSIS (HCC): ICD-10-CM

## 2024-03-05 RX ORDER — GLATIRAMER 40 MG/ML
INJECTION, SOLUTION SUBCUTANEOUS
Qty: 12 ML | Refills: 11 | Status: SHIPPED | OUTPATIENT
Start: 2024-03-05

## 2024-03-05 NOTE — TELEPHONE ENCOUNTER
Received request via: Pharmacy    Medication Name/Dosage Glatiramer Acetate (COPAXONE) 40 MG/ML Solution Prefilled Syringe     When was medication last prescribed 04/06/2022    How many refills were previously provided 12    How many Refills does he patient have left from last prescription 0    Was the patient seen in the last year in this department? Yes   Date of last office visit 08/01/23     Per last Neurology Office Visit, when was the date of next follow up visit set for?                            Date of office visit follow up request none      Does the patient have an upcoming appointment? No   If yes, when will call to schedule              If no, schedule appointment N/A     Does the patient have residential Plus and need 100 day supply (blood pressure, diabetes and cholesterol meds only)? Patient does not have SCP

## 2024-03-06 ENCOUNTER — TELEPHONE (OUTPATIENT)
Dept: NEUROLOGY | Facility: MEDICAL CENTER | Age: 54
End: 2024-03-06
Payer: COMMERCIAL

## 2024-03-06 NOTE — TELEPHONE ENCOUNTER
Glatiramer Acetate 40 MG/ML SOSY    [facility] - [facility] Other pharmacy 02/13/2024 next available refill payable on or after 03/08/2024  + - 03/06/2024 8:57am [facility]

## 2024-03-19 ENCOUNTER — APPOINTMENT (RX ONLY)
Dept: URBAN - METROPOLITAN AREA CLINIC 35 | Facility: CLINIC | Age: 54
Setting detail: DERMATOLOGY
End: 2024-03-19

## 2024-03-19 DIAGNOSIS — L65.9 NONSCARRING HAIR LOSS, UNSPECIFIED: ICD-10-CM | Status: INADEQUATELY CONTROLLED

## 2024-03-19 DIAGNOSIS — Z79.899 OTHER LONG TERM (CURRENT) DRUG THERAPY: ICD-10-CM

## 2024-03-19 PROCEDURE — ? HIGH RISK MEDICATION MONITORING

## 2024-03-19 PROCEDURE — ? COUNSELING

## 2024-03-19 PROCEDURE — ? TREATMENT REGIMEN

## 2024-03-19 PROCEDURE — ? PRESCRIPTION

## 2024-03-19 PROCEDURE — 99214 OFFICE O/P EST MOD 30 MIN: CPT

## 2024-03-19 PROCEDURE — ? ADDITIONAL NOTES

## 2024-03-19 RX ORDER — MINOXIDIL 2.5 MG/1
1 TABLET ORAL QD
Qty: 30 | Refills: 6 | Status: ERX

## 2024-03-19 NOTE — PROCEDURE: ADDITIONAL NOTES
Additional Notes: 12/19/23 pt had lab work done at [facility]. Will request labs
Render Risk Assessment In Note?: yes
Detail Level: Zone

## 2024-03-19 NOTE — PROCEDURE: TREATMENT REGIMEN
Initiate Treatment: - minoxidil 2.5 mg tablet Qd Take half a tablet once a day
Detail Level: Zone
Otc Regimen: - pt using Nutradol Women’s balance, taking only 2 pills right now. Discussed doing two pills at lunch time and two at dinner time

## 2024-03-19 NOTE — PROCEDURE: HIGH RISK MEDICATION MONITORING
Ketoconazole Pregnancy And Lactation Text: This medication is Pregnancy Category C and it isn't know if it is safe during pregnancy. It is also excreted in breast milk and breast feeding isn't recommended.
Minocycline Counseling: Patient advised regarding possible photosensitivity and discoloration of the teeth, skin, lips, tongue and gums.  Patient instructed to avoid sunlight, if possible.  When exposed to sunlight, patients should wear protective clothing, sunglasses, and sunscreen.  The patient was instructed to call the office immediately if the following severe adverse effects occur:  hearing changes, easy bruising/bleeding, severe headache, or vision changes.  The patient verbalized understanding of the proper use and possible adverse effects of minocycline.  All of the patient's questions and concerns were addressed.
Libtayo Counseling- I discussed with the patient the risks of Libtayo including but not limited to nausea, vomiting, diarrhea, and bone or muscle pain.  The patient verbalized understanding of the proper use and possible adverse effects of Libtayo.  All of the patient's questions and concerns were addressed.
Azelaic Acid Counseling: Patient counseled that medicine may cause skin irritation and to avoid applying near the eyes.  In the event of skin irritation, the patient was advised to reduce the amount of the drug applied or use it less frequently.   The patient verbalized understanding of the proper use and possible adverse effects of azelaic acid.  All of the patient's questions and concerns were addressed.
Tazorac Counseling:  Patient advised that medication is irritating and drying.  Patient may need to apply sparingly and wash off after an hour before eventually leaving it on overnight.  The patient verbalized understanding of the proper use and possible adverse effects of tazorac.  All of the patient's questions and concerns were addressed.
Finasteride Female Counseling: Finasteride Counseling:  I discussed with the patient the risks of use of finasteride including but not limited to decreased libido and sexual dysfunction. Explained the teratogenic nature of the medication and stressed the importance of not getting pregnant during treatment. All of the patient's questions and concerns were addressed.
5-Fu Pregnancy And Lactation Text: This medication is Pregnancy Category X and contraindicated in pregnancy and in women who may become pregnant. It is unknown if this medication is excreted in breast milk.
Hydroxyzine Pregnancy And Lactation Text: This medication is not safe during pregnancy and should not be taken. It is also excreted in breast milk and breast feeding isn't recommended.
Acitretin Counseling:  I discussed with the patient the risks of acitretin including but not limited to hair loss, dry lips/skin/eyes, liver damage, hyperlipidemia, depression/suicidal ideation, photosensitivity.  Serious rare side effects can include but are not limited to pancreatitis, pseudotumor cerebri, bony changes, clot formation/stroke/heart attack.  Patient understands that alcohol is contraindicated since it can result in liver toxicity and significantly prolong the elimination of the drug by many years.
Tetracycline Pregnancy And Lactation Text: This medication is Pregnancy Category D and not consider safe during pregnancy. It is also excreted in breast milk.
Bimzelx Pregnancy And Lactation Text: This medication crosses the placenta and the safety is uncertain during pregnancy. It is unknown if this medication is present in breast milk.
Dapsone Pregnancy And Lactation Text: This medication is Pregnancy Category C and is not considered safe during pregnancy or breast feeding.
Taltz Counseling: I discussed with the patient the risks of ixekizumab including but not limited to immunosuppression, serious infections, worsening of inflammatory bowel disease and drug reactions.  The patient understands that monitoring is required including a PPD at baseline and must alert us or the primary physician if symptoms of infection or other concerning signs are noted.
Rituxan Pregnancy And Lactation Text: This medication is Pregnancy Category C and it isn't know if it is safe during pregnancy. It is unknown if this medication is excreted in breast milk but similar antibodies are known to be excreted.
Enbrel Counseling:  I discussed with the patient the risks of etanercept including but not limited to myelosuppression, immunosuppression, autoimmune hepatitis, demyelinating diseases, lymphoma, and infections.  The patient understands that monitoring is required including a PPD at baseline and must alert us or the primary physician if symptoms of infection or other concerning signs are noted.
Drysol Counseling:  I discussed with the patient the risks of drysol/aluminum chloride including but not limited to skin rash, itching, irritation, burning.
Imiquimod Pregnancy And Lactation Text: This medication is Pregnancy Category C. It is unknown if this medication is excreted in breast milk.
Azelaic Acid Pregnancy And Lactation Text: This medication is considered safe during pregnancy and breast feeding.
Winlevi Counseling:  I discussed with the patient the risks of topical clascoterone including but not limited to erythema, scaling, itching, and stinging. Patient voiced their understanding.
Clindamycin Counseling: I counseled the patient regarding use of clindamycin as an antibiotic for prophylactic and/or therapeutic purposes. Clindamycin is active against numerous classes of bacteria, including skin bacteria. Side effects may include nausea, diarrhea, gastrointestinal upset, rash, hives, yeast infections, and in rare cases, colitis.
Tazorac Pregnancy And Lactation Text: This medication is not safe during pregnancy. It is unknown if this medication is excreted in breast milk.
Otezla Pregnancy And Lactation Text: This medication is Pregnancy Category C and it isn't known if it is safe during pregnancy. It is unknown if it is excreted in breast milk.
Thalidomide Pregnancy And Lactation Text: This medication is Pregnancy Category X and is absolutely contraindicated during pregnancy. It is unknown if it is excreted in breast milk.
Cellcept Pregnancy And Lactation Text: This medication is Pregnancy Category D and isn't considered safe during pregnancy. It is unknown if this medication is excreted in breast milk.
Niacinamide Counseling: I recommended taking niacin or niacinamide, also know as vitamin B3, twice daily. Recent evidence suggests that taking vitamin B3 (500 mg twice daily) can reduce the risk of actinic keratoses and non-melanoma skin cancers. Side effects of vitamin B3 include flushing and headache.
Protopic Counseling: Patient may experience a mild burning sensation during topical application. Protopic is not approved in children less than 2 years of age. There have been case reports of hematologic and skin malignancies in patients using topical calcineurin inhibitors although causality is questionable.
Finasteride Pregnancy And Lactation Text: This medication is absolutely contraindicated during pregnancy. It is unknown if it is excreted in breast milk.
Cimzia Counseling:  I discussed with the patient the risks of Cimzia including but not limited to immunosuppression, allergic reactions and infections.  The patient understands that monitoring is required including a PPD at baseline and must alert us or the primary physician if symptoms of infection or other concerning signs are noted.
Libtayo Pregnancy And Lactation Text: This medication is contraindicated in pregnancy and when breast feeding.
Terbinafine Counseling: Patient counseling regarding adverse effects of terbinafine including but not limited to headache, diarrhea, rash, upset stomach, liver function test abnormalities, itching, taste/smell disturbance, nausea, abdominal pain, and flatulence.  There is a rare possibility of liver failure that can occur when taking terbinafine.  The patient understands that a baseline LFT and kidney function test may be required. The patient verbalized understanding of the proper use and possible adverse effects of terbinafine.  All of the patient's questions and concerns were addressed.
Taltz Pregnancy And Lactation Text: The risk during pregnancy and breastfeeding is uncertain with this medication.
Siliq Counseling:  I discussed with the patient the risks of Siliq including but not limited to new or worsening depression, suicidal thoughts and behavior, immunosuppression, malignancy, posterior leukoencephalopathy syndrome, and serious infections.  The patient understands that monitoring is required including a PPD at baseline and must alert us or the primary physician if symptoms of infection or other concerning signs are noted. There is also a special program designed to monitor depression which is required with Siliq.
Enbrel Pregnancy And Lactation Text: This medication is Pregnancy Category B and is considered safe during pregnancy. It is unknown if this medication is excreted in breast milk.
Cibinqo Counseling: I discussed with the patient the risks of Cibinqo therapy including but not limited to common cold, nausea, headache, cold sores, increased blood CPK levels, dizziness, UTIs, fatigue, acne, and vomitting. Live vaccines should be avoided.  This medication has been linked to serious infections; higher rate of mortality; malignancy and lymphoproliferative disorders; major adverse cardiovascular events; thrombosis; thrombocytopenia and lymphopenia; lipid elevations; and retinal detachment.
Acitretin Pregnancy And Lactation Text: This medication is Pregnancy Category X and should not be given to women who are pregnant or may become pregnant in the future. This medication is excreted in breast milk.
Albendazole Counseling:  I discussed with the patient the risks of albendazole including but not limited to cytopenia, kidney damage, nausea/vomiting and severe allergy.  The patient understands that this medication is being used in an off-label manner.
Winlevi Pregnancy And Lactation Text: This medication is considered safe during pregnancy and breastfeeding.
Benzoyl Peroxide Counseling: Patient counseled that medicine may cause skin irritation and bleach clothing.  In the event of skin irritation, the patient was advised to reduce the amount of the drug applied or use it less frequently.   The patient verbalized understanding of the proper use and possible adverse effects of benzoyl peroxide.  All of the patient's questions and concerns were addressed.
Topical Clindamycin Counseling: Patient counseled that this medication may cause skin irritation or allergic reactions.  In the event of skin irritation, the patient was advised to reduce the amount of the drug applied or use it less frequently.   The patient verbalized understanding of the proper use and possible adverse effects of clindamycin.  All of the patient's questions and concerns were addressed.
Oxybutynin Counseling:  I discussed with the patient the risks of oxybutynin including but not limited to skin rash, drowsiness, dry mouth, difficulty urinating, and blurred vision.
Tranexamic Acid Counseling:  Patient advised of the small risk of bleeding problems with tranexamic acid. They were also instructed to call if they developed any nausea, vomiting or diarrhea. All of the patient's questions and concerns were addressed.
Niacinamide Pregnancy And Lactation Text: These medications are considered safe during pregnancy.
Gabapentin Counseling: I discussed with the patient the risks of gabapentin including but not limited to dizziness, somnolence, fatigue and ataxia.
Cibinqo Pregnancy And Lactation Text: It is unknown if this medication will adversely affect pregnancy or breast feeding.  You should not take this medication if you are currently pregnant or planning a pregnancy or while breastfeeding.
Cyclophosphamide Counseling:  I discussed with the patient the risks of cyclophosphamide including but not limited to hair loss, hormonal abnormalities, decreased fertility, abdominal pain, diarrhea, nausea and vomiting, bone marrow suppression and infection. The patient understands that monitoring is required while taking this medication.
Birth Control Pills Counseling: Birth Control Pill Counseling: I discussed with the patient the potential side effects of OCPs including but not limited to increased risk of stroke, heart attack, thrombophlebitis, deep venous thrombosis, hepatic adenomas, breast changes, GI upset, headaches, and depression.  The patient verbalized understanding of the proper use and possible adverse effects of OCPs. All of the patient's questions and concerns were addressed.
Fluconazole Counseling:  Patient counseled regarding adverse effects of fluconazole including but not limited to headache, diarrhea, nausea, upset stomach, liver function test abnormalities, taste disturbance, and stomach pain.  There is a rare possibility of liver failure that can occur when taking fluconazole.  The patient understands that monitoring of LFTs and kidney function test may be required, especially at baseline. The patient verbalized understanding of the proper use and possible adverse effects of fluconazole.  All of the patient's questions and concerns were addressed.
Protopic Pregnancy And Lactation Text: This medication is Pregnancy Category C. It is unknown if this medication is excreted in breast milk when applied topically.
Clindamycin Pregnancy And Lactation Text: This medication can be used in pregnancy if certain situations. Clindamycin is also present in breast milk.
Minoxidil Counseling: Minoxidil is a topical medication which can increase blood flow where it is applied. It is uncertain how this medication increases hair growth. Side effects are uncommon and include stinging and allergic reactions.
Cyclophosphamide Pregnancy And Lactation Text: This medication is Pregnancy Category D and it isn't considered safe during pregnancy. This medication is excreted in breast milk.
Terbinafine Pregnancy And Lactation Text: This medication is Pregnancy Category B and is considered safe during pregnancy. It is also excreted in breast milk and breast feeding isn't recommended.
Odomzo Counseling- I discussed with the patient the risks of Odomzo including but not limited to nausea, vomiting, diarrhea, constipation, weight loss, changes in the sense of taste, decreased appetite, muscle spasms, and hair loss.  The patient verbalized understanding of the proper use and possible adverse effects of Odomzo.  All of the patient's questions and concerns were addressed.
Quinolones Counseling:  I discussed with the patient the risks of fluoroquinolones including but not limited to GI upset, allergic reaction, drug rash, diarrhea, dizziness, photosensitivity, yeast infections, liver function test abnormalities, tendonitis/tendon rupture.
Oxybutynin Pregnancy And Lactation Text: This medication is Pregnancy Category B and is considered safe during pregnancy. It is unknown if it is excreted in breast milk.
Nsaids Counseling: NSAID Counseling: I discussed with the patient that NSAIDs should be taken with food. Prolonged use of NSAIDs can result in the development of stomach ulcers.  Patient advised to stop taking NSAIDs if abdominal pain occurs.  The patient verbalized understanding of the proper use and possible adverse effects of NSAIDs.  All of the patient's questions and concerns were addressed.
Humira Counseling:  I discussed with the patient the risks of adalimumab including but not limited to myelosuppression, immunosuppression, autoimmune hepatitis, demyelinating diseases, lymphoma, and serious infections.  The patient understands that monitoring is required including a PPD at baseline and must alert us or the primary physician if symptoms of infection or other concerning signs are noted.
Bexarotene Counseling:  I discussed with the patient the risks of bexarotene including but not limited to hair loss, dry lips/skin/eyes, liver abnormalities, hyperlipidemia, pancreatitis, depression/suicidal ideation, photosensitivity, drug rash/allergic reactions, hypothyroidism, anemia, leukopenia, infection, cataracts, and teratogenicity.  Patient understands that they will need regular blood tests to check lipid profile, liver function tests, white blood cell count, thyroid function tests and pregnancy test if applicable.
Albendazole Pregnancy And Lactation Text: This medication is Pregnancy Category C and it isn't known if it is safe during pregnancy. It is also excreted in breast milk.
Tremfya Counseling: I discussed with the patient the risks of guselkumab including but not limited to immunosuppression, serious infections, worsening of inflammatory bowel disease and drug reactions.  The patient understands that monitoring is required including a PPD at baseline and must alert us or the primary physician if symptoms of infection or other concerning signs are noted.
Birth Control Pills Pregnancy And Lactation Text: This medication should be avoided if pregnant and for the first 30 days post-partum.
Olumiant Counseling: I discussed with the patient the risks of Olumiant therapy including but not limited to upper respiratory tract infections, shingles, cold sores, and nausea. Live vaccines should be avoided.  This medication has been linked to serious infections; higher rate of mortality; malignancy and lymphoproliferative disorders; major adverse cardiovascular events; thrombosis; gastrointestinal perforations; neutropenia; lymphopenia; anemia; liver enzyme elevations; and lipid elevations.
Benzoyl Peroxide Pregnancy And Lactation Text: This medication is Pregnancy Category C. It is unknown if benzoyl peroxide is excreted in breast milk.
Tranexamic Acid Pregnancy And Lactation Text: It is unknown if this medication is safe during pregnancy or breast feeding.
Rhofade Counseling: Rhofade is a topical medication which can decrease superficial blood flow where applied. Side effects are uncommon and include stinging, redness and allergic reactions.
Gabapentin Pregnancy And Lactation Text: This medication is Pregnancy Category C and isn't considered safe during pregnancy. It is excreted in breast milk.
Elidel Counseling: Patient may experience a mild burning sensation during topical application. Elidel is not approved in children less than 2 years of age. There have been case reports of hematologic and skin malignancies in patients using topical calcineurin inhibitors although causality is questionable.
Minoxidil Pregnancy And Lactation Text: This medication has not been assigned a Pregnancy Risk Category but animal studies failed to show danger with the topical medication. It is unknown if the medication is excreted in breast milk.
Arava Counseling:  Patient counseled regarding adverse effects of Arava including but not limited to nausea, vomiting, abnormalities in liver function tests. Patients may develop mouth sores, rash, diarrhea, and abnormalities in blood counts. The patient understands that monitoring is required including LFTs and blood counts.  There is a rare possibility of scarring of the liver and lung problems that can occur when taking methotrexate. Persistent nausea, loss of appetite, pale stools, dark urine, cough, and shortness of breath should be reported immediately. Patient advised to discontinue Arava treatment and consult with a physician prior to attempting conception. The patient will have to undergo a treatment to eliminate Arava from the body prior to conception.
Zyclara Counseling:  I discussed with the patient the risks of imiquimod including but not limited to erythema, scaling, itching, weeping, crusting, and pain.  Patient understands that the inflammatory response to imiquimod is variable from person to person and was educated regarded proper titration schedule.  If flu-like symptoms develop, patient knows to discontinue the medication and contact us.
Detail Level: Zone
Doxycycline Counseling:  Patient counseled regarding possible photosensitivity and increased risk for sunburn.  Patient instructed to avoid sunlight, if possible.  When exposed to sunlight, patients should wear protective clothing, sunglasses, and sunscreen.  The patient was instructed to call the office immediately if the following severe adverse effects occur:  hearing changes, easy bruising/bleeding, severe headache, or vision changes.  The patient verbalized understanding of the proper use and possible adverse effects of doxycycline.  All of the patient's questions and concerns were addressed.
Fluconazole Pregnancy And Lactation Text: This medication is Pregnancy Category C and it isn't know if it is safe during pregnancy. It is also excreted in breast milk.
Simponi Counseling:  I discussed with the patient the risks of golimumab including but not limited to myelosuppression, immunosuppression, autoimmune hepatitis, demyelinating diseases, lymphoma, and serious infections.  The patient understands that monitoring is required including a PPD at baseline and must alert us or the primary physician if symptoms of infection or other concerning signs are noted.
Ivermectin Counseling:  Patient instructed to take medication on an empty stomach with a full glass of water.  Patient informed of potential adverse effects including but not limited to nausea, diarrhea, dizziness, itching, and swelling of the extremities or lymph nodes.  The patient verbalized understanding of the proper use and possible adverse effects of ivermectin.  All of the patient's questions and concerns were addressed.
Cyclosporine Counseling:  I discussed with the patient the risks of cyclosporine including but not limited to hypertension, gingival hyperplasia,myelosuppression, immunosuppression, liver damage, kidney damage, neurotoxicity, lymphoma, and serious infections. The patient understands that monitoring is required including baseline blood pressure, CBC, CMP, lipid panel and uric acid, and then 1-2 times monthly CMP and blood pressure.
Bexarotene Pregnancy And Lactation Text: This medication is Pregnancy Category X and should not be given to women who are pregnant or may become pregnant. This medication should not be used if you are breast feeding.
Valtrex Counseling: I discussed with the patient the risks of valacyclovir including but not limited to kidney damage, nausea, vomiting and severe allergy.  The patient understands that if the infection seems to be worsening or is not improving, they are to call.
Azithromycin Counseling:  I discussed with the patient the risks of azithromycin including but not limited to GI upset, allergic reaction, drug rash, diarrhea, and yeast infections.
Nsaids Pregnancy And Lactation Text: These medications are considered safe up to 30 weeks gestation. It is excreted in breast milk.
Rhofade Pregnancy And Lactation Text: This medication has not been assigned a Pregnancy Risk Category. It is unknown if the medication is excreted in breast milk.
Rinvoq Pregnancy And Lactation Text: Based on animal studies, Rinvoq may cause embryo-fetal harm when administered to pregnant women.  The medication should not be used in pregnancy.  Breastfeeding is not recommended during treatment and for 6 days after the last dose.
Glycopyrrolate Counseling:  I discussed with the patient the risks of glycopyrrolate including but not limited to skin rash, drowsiness, dry mouth, difficulty urinating, and blurred vision.
Propranolol Counseling:  I discussed with the patient the risks of propranolol including but not limited to low heart rate, low blood pressure, low blood sugar, restlessness and increased cold sensitivity. They should call the office if they experience any of these side effects.
Doxycycline Pregnancy And Lactation Text: This medication is Pregnancy Category D and not consider safe during pregnancy. It is also excreted in breast milk but is considered safe for shorter treatment courses.
Olumiant Pregnancy And Lactation Text: Based on animal studies, Olumiant may cause embryo-fetal harm when administered to pregnant women.  The medication should not be used in pregnancy.  Breastfeeding is not recommended during treatment.
Mirvaso Counseling: Mirvaso is a topical medication which can decrease superficial blood flow where applied. Side effects are uncommon and include stinging, redness and allergic reactions.
Griseofulvin Counseling:  I discussed with the patient the risks of griseofulvin including but not limited to photosensitivity, cytopenia, liver damage, nausea/vomiting and severe allergy.  The patient understands that this medication is best absorbed when taken with a fatty meal (e.g., ice cream or french fries).
Cimetidine Counseling:  I discussed with the patient the risks of Cimetidine including but not limited to gynecomastia, headache, diarrhea, nausea, drowsiness, arrhythmias, pancreatitis, skin rashes, psychosis, bone marrow suppression and kidney toxicity.
Topical Ketoconazole Counseling: Patient counseled that this medication may cause skin irritation or allergic reactions.  In the event of skin irritation, the patient was advised to reduce the amount of the drug applied or use it less frequently.   The patient verbalized understanding of the proper use and possible adverse effects of ketoconazole.  All of the patient's questions and concerns were addressed.
Carac Counseling:  I discussed with the patient the risks of Carac including but not limited to erythema, scaling, itching, weeping, crusting, and pain.
Cimzia Pregnancy And Lactation Text: This medication crosses the placenta but can be considered safe in certain situations. Cimzia may be excreted in breast milk.
Isotretinoin Counseling: Patient should get monthly blood tests, not donate blood, not drive at night if vision affected, not share medication, and not undergo elective surgery for 6 months after tx completed. Side effects reviewed, pt to contact office should one occur.
Rifampin Counseling: I discussed with the patient the risks of rifampin including but not limited to liver damage, kidney damage, red-orange body fluids, nausea/vomiting and severe allergy.
Spironolactone Counseling: Patient advised regarding risks of diarrhea, abdominal pain, hyperkalemia, birth defects (for female patients), liver toxicity and renal toxicity. The patient may need blood work to monitor liver and kidney function and potassium levels while on therapy. The patient verbalized understanding of the proper use and possible adverse effects of spironolactone.  All of the patient's questions and concerns were addressed.
Clofazimine Counseling:  I discussed with the patient the risks of clofazimine including but not limited to skin and eye pigmentation, liver damage, nausea/vomiting, gastrointestinal bleeding and allergy.
Xolair Counseling:  Patient informed of potential adverse effects including but not limited to fever, muscle aches, rash and allergic reactions.  The patient verbalized understanding of the proper use and possible adverse effects of Xolair.  All of the patient's questions and concerns were addressed.
Ilumya Counseling: I discussed with the patient the risks of tildrakizumab including but not limited to immunosuppression, malignancy, posterior leukoencephalopathy syndrome, and serious infections.  The patient understands that monitoring is required including a PPD at baseline and must alert us or the primary physician if symptoms of infection or other concerning signs are noted.
Azithromycin Pregnancy And Lactation Text: This medication is considered safe during pregnancy and is also secreted in breast milk.
Eucrisa Counseling: Patient may experience a mild burning sensation during topical application. Eucrisa is not approved in children less than 2 years of age.
Dutasteride Male Counseling: Dustasteride Counseling:  I discussed with the patient the risks of use of dutasteride including but not limited to decreased libido, decreased ejaculate volume, and gynecomastia. Women who can become pregnant should not handle medication.  All of the patient's questions and concerns were addressed.
Valtrex Pregnancy And Lactation Text: this medication is Pregnancy Category B and is considered safe during pregnancy. This medication is not directly found in breast milk but it's metabolite acyclovir is present.
Cyclosporine Pregnancy And Lactation Text: This medication is Pregnancy Category C and it isn't know if it is safe during pregnancy. This medication is excreted in breast milk.
Olanzapine Counseling- I discussed with the patient the common side effects of olanzapine including but are not limited to: lack of energy, dry mouth, increased appetite, sleepiness, tremor, constipation, dizziness, changes in behavior, or restlessness.  Explained that teenagers are more likely to experience headaches, abdominal pain, pain in the arms or legs, tiredness, and sleepiness.  Serious side effects include but are not limited: increased risk of death in elderly patients who are confused, have memory loss, or dementia-related psychosis; hyperglycemia; increased cholesterol and triglycerides; and weight gain.
Glycopyrrolate Pregnancy And Lactation Text: This medication is Pregnancy Category B and is considered safe during pregnancy. It is unknown if it is excreted breast milk.
Erythromycin Counseling:  I discussed with the patient the risks of erythromycin including but not limited to GI upset, allergic reaction, drug rash, diarrhea, increase in liver enzymes, and yeast infections.
Sotyktu Counseling:  I discussed the most common side effects of Sotyktu including: common cold, sore throat, sinus infections, cold sores, canker sores, folliculitis, and acne.? I also discussed more serious side effects of Sotyktu including but not limited to: serious allergic reactions; increased risk for infections such as TB; cancers such as lymphomas; rhabdomyolysis and elevated CPK; and elevated triglycerides and liver enzymes.?
Spironolactone Pregnancy And Lactation Text: This medication can cause feminization of the male fetus and should be avoided during pregnancy. The active metabolite is also found in breast milk.
Griseofulvin Pregnancy And Lactation Text: This medication is Pregnancy Category X and is known to cause serious birth defects. It is unknown if this medication is excreted in breast milk but breast feeding should be avoided.
Solaraze Counseling:  I discussed with the patient the risks of Solaraze including but not limited to erythema, scaling, itching, weeping, crusting, and pain.
Xolair Pregnancy And Lactation Text: This medication is Pregnancy Category B and is considered safe during pregnancy. This medication is excreted in breast milk.
Rifampin Pregnancy And Lactation Text: This medication is Pregnancy Category C and it isn't know if it is safe during pregnancy. It is also excreted in breast milk and should not be used if you are breast feeding.
Isotretinoin Pregnancy And Lactation Text: This medication is Pregnancy Category X and is considered extremely dangerous during pregnancy. It is unknown if it is excreted in breast milk.
Rinvoq Counseling: I discussed with the patient the risks of Rinvoq therapy including but not limited to upper respiratory tract infections, shingles, cold sores, bronchitis, nausea, cough, fever, acne, and headache. Live vaccines should be avoided.  This medication has been linked to serious infections; higher rate of mortality; malignancy and lymphoproliferative disorders; major adverse cardiovascular events; thrombosis; thrombocytopenia, anemia, and neutropenia; lipid elevations; liver enzyme elevations; and gastrointestinal perforations.
Include Pregnancy/Lactation Warning?: No
Bactrim Counseling:  I discussed with the patient the risks of sulfa antibiotics including but not limited to GI upset, allergic reaction, drug rash, diarrhea, dizziness, photosensitivity, and yeast infections.  Rarely, more serious reactions can occur including but not limited to aplastic anemia, agranulocytosis, methemoglobinemia, blood dyscrasias, liver or kidney failure, lung infiltrates or desquamative/blistering drug rashes.
Doxepin Counseling:  Patient advised that the medication is sedating and not to drive a car after taking this medication. Patient informed of potential adverse effects including but not limited to dry mouth, urinary retention, and blurry vision.  The patient verbalized understanding of the proper use and possible adverse effects of doxepin.  All of the patient's questions and concerns were addressed.
Skyrizi Counseling: I discussed with the patient the risks of risankizumab-rzaa including but not limited to immunosuppression, and serious infections.  The patient understands that monitoring is required including a PPD at baseline and must alert us or the primary physician if symptoms of infection or other concerning signs are noted.
Olanzapine Pregnancy And Lactation Text: This medication is pregnancy category C.   There are no adequate and well controlled trials with olanzapine in pregnant females.  Olanzapine should be used during pregnancy only if the potential benefit justifies the potential risk to the fetus.   In a study in lactating healthy women, olanzapine was excreted in breast milk.  It is recommended that women taking olanzapine should not breast feed.
Cosentyx Counseling:  I discussed with the patient the risks of Cosentyx including but not limited to worsening of Crohn's disease, immunosuppression, allergic reactions and infections.  The patient understands that monitoring is required including a PPD at baseline and must alert us or the primary physician if symptoms of infection or other concerning signs are noted.
Calcipotriene Counseling:  I discussed with the patient the risks of calcipotriene including but not limited to erythema, scaling, itching, and irritation.
Methotrexate Counseling:  Patient counseled regarding adverse effects of methotrexate including but not limited to nausea, vomiting, abnormalities in liver function tests. Patients may develop mouth sores, rash, diarrhea, and abnormalities in blood counts. The patient understands that monitoring is required including LFT's and blood counts.  There is a rare possibility of scarring of the liver and lung problems that can occur when taking methotrexate. Persistent nausea, loss of appetite, pale stools, dark urine, cough, and shortness of breath should be reported immediately. Patient advised to discontinue methotrexate treatment at least three months before attempting to become pregnant.  I discussed the need for folate supplements while taking methotrexate.  These supplements can decrease side effects during methotrexate treatment. The patient verbalized understanding of the proper use and possible adverse effects of methotrexate.  All of the patient's questions and concerns were addressed.
Erythromycin Pregnancy And Lactation Text: This medication is Pregnancy Category B and is considered safe during pregnancy. It is also excreted in breast milk.
Topical Sulfur Applications Counseling: Topical Sulfur Counseling: Patient counseled that this medication may cause skin irritation or allergic reactions.  In the event of skin irritation, the patient was advised to reduce the amount of the drug applied or use it less frequently.   The patient verbalized understanding of the proper use and possible adverse effects of topical sulfur application.  All of the patient's questions and concerns were addressed.
Dutasteride Female Counseling: Dutasteride Counseling:  I discussed with the patient the risks of use of dutasteride including but not limited to decreased libido and sexual dysfunction. Explained the teratogenic nature of the medication and stressed the importance of not getting pregnant during treatment. All of the patient's questions and concerns were addressed.
Solaraze Pregnancy And Lactation Text: This medication is Pregnancy Category B and is considered safe. There is some data to suggest avoiding during the third trimester. It is unknown if this medication is excreted in breast milk.
Propranolol Pregnancy And Lactation Text: This medication is Pregnancy Category C and it isn't known if it is safe during pregnancy. It is excreted in breast milk.
Sotyktu Pregnancy And Lactation Text: There is insufficient data to evaluate whether or not Sotyktu is safe to use during pregnancy.? ?It is not known if Sotyktu passes into breast milk and whether or not it is safe to use when breastfeeding.??
Hydroxychloroquine Counseling:  I discussed with the patient that a baseline ophthalmologic exam is needed at the start of therapy and every year thereafter while on therapy. A CBC may also be warranted for monitoring.  The side effects of this medication were discussed with the patient, including but not limited to agranulocytosis, aplastic anemia, seizures, rashes, retinopathy, and liver toxicity. Patient instructed to call the office should any adverse effect occur.  The patient verbalized understanding of the proper use and possible adverse effects of Plaquenil.  All the patient's questions and concerns were addressed.
Opzelura Counseling:  I discussed with the patient the risks of Opzelura including but not limited to nasopharngitis, bronchitis, ear infection, eosinophila, hives, diarrhea, folliculitis, tonsillitis, and rhinorrhea.  Taken orally, this medication has been linked to serious infections; higher rate of mortality; malignancy and lymphoproliferative disorders; major adverse cardiovascular events; thrombosis; thrombocytopenia, anemia, and neutropenia; and lipid elevations.
Sarecycline Counseling: Patient advised regarding possible photosensitivity and discoloration of the teeth, skin, lips, tongue and gums.  Patient instructed to avoid sunlight, if possible.  When exposed to sunlight, patients should wear protective clothing, sunglasses, and sunscreen.  The patient was instructed to call the office immediately if the following severe adverse effects occur:  hearing changes, easy bruising/bleeding, severe headache, or vision changes.  The patient verbalized understanding of the proper use and possible adverse effects of sarecycline.  All of the patient's questions and concerns were addressed.
Adbry Counseling: I discussed with the patient the risks of tralokinumab including but not limited to eye infection and irritation, cold sores, injection site reactions, worsening of asthma, allergic reactions and increased risk of parasitic infection.  Live vaccines should be avoided while taking tralokinumab. The patient understands that monitoring is required and they must alert us or the primary physician if symptoms of infection or other concerning signs are noted.
Itraconazole Counseling:  I discussed with the patient the risks of itraconazole including but not limited to liver damage, nausea/vomiting, neuropathy, and severe allergy.  The patient understands that this medication is best absorbed when taken with acidic beverages such as non-diet cola or ginger ale.  The patient understands that monitoring is required including baseline LFTs and repeat LFTs at intervals.  The patient understands that they are to contact us or the primary physician if concerning signs are noted.
Infliximab Counseling:  I discussed with the patient the risks of infliximab including but not limited to myelosuppression, immunosuppression, autoimmune hepatitis, demyelinating diseases, lymphoma, and serious infections.  The patient understands that monitoring is required including a PPD at baseline and must alert us or the primary physician if symptoms of infection or other concerning signs are noted.
High Dose Vitamin A Counseling: Side effects reviewed, pt to contact office should one occur.
Xeljanz Counseling: I discussed with the patient the risks of Xeljanz therapy including increased risk of infection, liver issues, headache, diarrhea, or cold symptoms. Live vaccines should be avoided. They were instructed to call if they have any problems.
Topical Sulfur Applications Pregnancy And Lactation Text: This medication is Pregnancy Category C and has an unknown safety profile during pregnancy. It is unknown if this topical medication is excreted in breast milk.
Dutasteride Pregnancy And Lactation Text: This medication is absolutely contraindicated in women, especially during pregnancy and breast feeding. Feminization of male fetuses is possible if taking while pregnant.
Calcipotriene Pregnancy And Lactation Text: This medication has not been proven safe during pregnancy. It is unknown if this medication is excreted in breast milk.
Opzelura Pregnancy And Lactation Text: There is insufficient data to evaluate drug-associated risk for major birth defects, miscarriage, or other adverse maternal or fetal outcomes.  There is a pregnancy registry that monitors pregnancy outcomes in pregnant persons exposed to the medication during pregnancy.  It is unknown if this medication is excreted in breast milk.  Do not breastfeed during treatment and for about 4 weeks after the last dose.
Oral Minoxidil Counseling- I discussed with the patient the risks of oral minoxidil including but not limited to shortness of breath, swelling of the feet or ankles, dizziness, lightheadedness, unwanted hair growth and allergic reaction.  The patient verbalized understanding of the proper use and possible adverse effects of oral minoxidil.  All of the patient's questions and concerns were addressed.
Doxepin Pregnancy And Lactation Text: This medication is Pregnancy Category C and it isn't known if it is safe during pregnancy. It is also excreted in breast milk and breast feeding isn't recommended.
SSKI Counseling:  I discussed with the patient the risks of SSKI including but not limited to thyroid abnormalities, metallic taste, GI upset, fever, headache, acne, arthralgias, paraesthesias, lymphadenopathy, easy bleeding, arrhythmias, and allergic reaction.
Topical Retinoid counseling:  Patient advised to apply a pea-sized amount only at bedtime and wait 30 minutes after washing their face before applying.  If too drying, patient may add a non-comedogenic moisturizer. The patient verbalized understanding of the proper use and possible adverse effects of retinoids.  All of the patient's questions and concerns were addressed.
Hydroxychloroquine Pregnancy And Lactation Text: This medication has been shown to cause fetal harm but it isn't assigned a Pregnancy Risk Category. There are small amounts excreted in breast milk.
Colchicine Counseling:  Patient counseled regarding adverse effects including but not limited to stomach upset (nausea, vomiting, stomach pain, or diarrhea).  Patient instructed to limit alcohol consumption while taking this medication.  Colchicine may reduce blood counts especially with prolonged use.  The patient understands that monitoring of kidney function and blood counts may be required, especially at baseline. The patient verbalized understanding of the proper use and possible adverse effects of colchicine.  All of the patient's questions and concerns were addressed.
Azathioprine Counseling:  I discussed with the patient the risks of azathioprine including but not limited to myelosuppression, immunosuppression, hepatotoxicity, lymphoma, and infections.  The patient understands that monitoring is required including baseline LFTs, Creatinine, possible TPMP genotyping and weekly CBCs for the first month and then every 2 weeks thereafter.  The patient verbalized understanding of the proper use and possible adverse effects of azathioprine.  All of the patient's questions and concerns were addressed.
Methotrexate Pregnancy And Lactation Text: This medication is Pregnancy Category X and is known to cause fetal harm. This medication is excreted in breast milk.
Adbry Pregnancy And Lactation Text: It is unknown if this medication will adversely affect pregnancy or breast feeding.
Erivedge Counseling- I discussed with the patient the risks of Erivedge including but not limited to nausea, vomiting, diarrhea, constipation, weight loss, changes in the sense of taste, decreased appetite, muscle spasms, and hair loss.  The patient verbalized understanding of the proper use and possible adverse effects of Erivedge.  All of the patient's questions and concerns were addressed.
Bactrim Pregnancy And Lactation Text: This medication is Pregnancy Category D and is known to cause fetal risk.  It is also excreted in breast milk.
Hydroquinone Counseling:  Patient advised that medication may result in skin irritation, lightening (hypopigmentation), dryness, and burning.  In the event of skin irritation, the patient was advised to reduce the amount of the drug applied or use it less frequently.  Rarely, spots that are treated with hydroquinone can become darker (pseudoochronosis).  Should this occur, patient instructed to stop medication and call the office. The patient verbalized understanding of the proper use and possible adverse effects of hydroquinone.  All of the patient's questions and concerns were addressed.
Dupixent Counseling: I discussed with the patient the risks of dupilumab including but not limited to eye infection and irritation, cold sores, injection site reactions, worsening of asthma, allergic reactions and increased risk of parasitic infection.  Live vaccines should be avoided while taking dupilumab. Dupilumab will also interact with certain medications such as warfarin and cyclosporine. The patient understands that monitoring is required and they must alert us or the primary physician if symptoms of infection or other concerning signs are noted.
Prednisone Counseling:  I discussed with the patient the risks of prolonged use of prednisone including but not limited to weight gain, insomnia, osteoporosis, mood changes, diabetes, susceptibility to infection, glaucoma and high blood pressure.  In cases where prednisone use is prolonged, patients should be monitored with blood pressure checks, serum glucose levels and an eye exam.  Additionally, the patient may need to be placed on GI prophylaxis, PCP prophylaxis, and calcium and vitamin D supplementation and/or a bisphosphonate.  The patient verbalized understanding of the proper use and the possible adverse effects of prednisone.  All of the patient's questions and concerns were addressed.
Metronidazole Counseling:  I discussed with the patient the risks of metronidazole including but not limited to seizures, nausea/vomiting, a metallic taste in the mouth, nausea/vomiting and severe allergy.
High Dose Vitamin A Pregnancy And Lactation Text: High dose vitamin A therapy is contraindicated during pregnancy and breast feeding.
Oral Minoxidil Pregnancy And Lactation Text: This medication should only be used when clearly needed if you are pregnant, attempting to become pregnant or breast feeding.
Sski Pregnancy And Lactation Text: This medication is Pregnancy Category D and isn't considered safe during pregnancy. It is excreted in breast milk.
Low Dose Naltrexone Counseling- I discussed with the patient the potential risks and side effects of low dose naltrexone including but not limited to: more vivid dreams, headaches, nausea, vomiting, abdominal pain, fatigue, dizziness, and anxiety.
Opioid Counseling: I discussed with the patient the potential side effects of opioids including but not limited to addiction, altered mental status, and depression. I stressed avoiding alcohol, benzodiazepines, muscle relaxants and sleep aids unless specifically okayed by a physician. The patient verbalized understanding of the proper use and possible adverse effects of opioids. All of the patient's questions and concerns were addressed. They were instructed to flush the remaining pills down the toilet if they did not need them for pain.
Stelara Counseling:  I discussed with the patient the risks of ustekinumab including but not limited to immunosuppression, malignancy, posterior leukoencephalopathy syndrome, and serious infections.  The patient understands that monitoring is required including a PPD at baseline and must alert us or the primary physician if symptoms of infection or other concerning signs are noted.
Finasteride Male Counseling: Finasteride Counseling:  I discussed with the patient the risks of use of finasteride including but not limited to decreased libido, decreased ejaculate volume, gynecomastia, and depression. Women should not handle medication.  All of the patient's questions and concerns were addressed.
Picato Counseling:  I discussed with the patient the risks of Picato including but not limited to erythema, scaling, itching, weeping, crusting, and pain.
Xelliliz Pregnancy And Lactation Text: This medication is Pregnancy Category D and is not considered safe during pregnancy.  The risk during breast feeding is also uncertain.
Cephalexin Counseling: I counseled the patient regarding use of cephalexin as an antibiotic for prophylactic and/or therapeutic purposes. Cephalexin (commonly prescribed under brand name Keflex) is a cephalosporin antibiotic which is active against numerous classes of bacteria, including most skin bacteria. Side effects may include nausea, diarrhea, gastrointestinal upset, rash, hives, yeast infections, and in rare cases, hepatitis, kidney disease, seizures, fever, confusion, neurologic symptoms, and others. Patients with severe allergies to penicillin medications are cautioned that there is about a 10% incidence of cross-reactivity with cephalosporins. When possible, patients with penicillin allergies should use alternatives to cephalosporins for antibiotic therapy.
Hydroxyzine Counseling: Patient advised that the medication is sedating and not to drive a car after taking this medication.  Patient informed of potential adverse effects including but not limited to dry mouth, urinary retention, and blurry vision.  The patient verbalized understanding of the proper use and possible adverse effects of hydroxyzine.  All of the patient's questions and concerns were addressed.
5-Fu Counseling: 5-Fluorouracil Counseling:  I discussed with the patient the risks of 5-fluorouracil including but not limited to erythema, scaling, itching, weeping, crusting, and pain.
Wartpeel Counseling:  I discussed with the patient the risks of Wartpeel including but not limited to erythema, scaling, itching, weeping, crusting, and pain.
Bimzelx Counseling:  I discussed with the patient the risks of Bimzelx including but not limited to depression, immunosuppression, allergic reactions and infections.  The patient understands that monitoring is required including a PPD at baseline and must alert us or the primary physician if symptoms of infection or other concerning signs are noted.
Ketoconazole Counseling:   Patient counseled regarding improving absorption with orange juice.  Adverse effects include but are not limited to breast enlargement, headache, diarrhea, nausea, upset stomach, liver function test abnormalities, taste disturbance, and stomach pain.  There is a rare possibility of liver failure that can occur when taking ketoconazole. The patient understands that monitoring of LFTs may be required, especially at baseline. The patient verbalized understanding of the proper use and possible adverse effects of ketoconazole.  All of the patient's questions and concerns were addressed.
Metronidazole Pregnancy And Lactation Text: This medication is Pregnancy Category B and considered safe during pregnancy.  It is also excreted in breast milk.
Rituxan Counseling:  I discussed with the patient the risks of Rituxan infusions. Side effects can include infusion reactions, severe drug rashes including mucocutaneous reactions, reactivation of latent hepatitis and other infections and rarely progressive multifocal leukoencephalopathy.  All of the patient's questions and concerns were addressed.
Dupixent Pregnancy And Lactation Text: This medication likely crosses the placenta but the risk for the fetus is uncertain. This medication is excreted in breast milk.
Tetracycline Counseling: Patient counseled regarding possible photosensitivity and increased risk for sunburn.  Patient instructed to avoid sunlight, if possible.  When exposed to sunlight, patients should wear protective clothing, sunglasses, and sunscreen.  The patient was instructed to call the office immediately if the following severe adverse effects occur:  hearing changes, easy bruising/bleeding, severe headache, or vision changes.  The patient verbalized understanding of the proper use and possible adverse effects of tetracycline.  All of the patient's questions and concerns were addressed. Patient understands to avoid pregnancy while on therapy due to potential birth defects.
Imiquimod Counseling:  I discussed with the patient the risks of imiquimod including but not limited to erythema, scaling, itching, weeping, crusting, and pain.  Patient understands that the inflammatory response to imiquimod is variable from person to person and was educated regarded proper titration schedule.  If flu-like symptoms develop, patient knows to discontinue the medication and contact us.
Cephalexin Pregnancy And Lactation Text: This medication is Pregnancy Category B and considered safe during pregnancy.  It is also excreted in breast milk but can be used safely for shorter doses.
Thalidomide Counseling: I discussed with the patient the risks of thalidomide including but not limited to birth defects, anxiety, weakness, chest pain, dizziness, cough and severe allergy.
Dapsone Counseling: I discussed with the patient the risks of dapsone including but not limited to hemolytic anemia, agranulocytosis, rashes, methemoglobinemia, kidney failure, peripheral neuropathy, headaches, GI upset, and liver toxicity.  Patients who start dapsone require monitoring including baseline LFTs and weekly CBCs for the first month, then every month thereafter.  The patient verbalized understanding of the proper use and possible adverse effects of dapsone.  All of the patient's questions and concerns were addressed.
Opioid Pregnancy And Lactation Text: These medications can lead to premature delivery and should be avoided during pregnancy. These medications are also present in breast milk in small amounts.
Cellcept Counseling:  I discussed with the patient the risks of mycophenolate mofetil including but not limited to infection/immunosuppression, GI upset, hypokalemia, hypercholesterolemia, bone marrow suppression, lymphoproliferative disorders, malignancy, GI ulceration/bleed/perforation, colitis, interstitial lung disease, kidney failure, progressive multifocal leukoencephalopathy, and birth defects.  The patient understands that monitoring is required including a baseline creatinine and regular CBC testing. In addition, patient must alert us immediately if symptoms of infection or other concerning signs are noted.
Otezla Counseling: The side effects of Otezla were discussed with the patient, including but not limited to worsening or new depression, weight loss, diarrhea, nausea, upper respiratory tract infection, and headache. Patient instructed to call the office should any adverse effect occur.  The patient verbalized understanding of the proper use and possible adverse effects of Otezla.  All the patient's questions and concerns were addressed.
Low Dose Naltrexone Pregnancy And Lactation Text: Naltrexone is pregnancy category C.  There have been no adequate and well-controlled studies in pregnant women.  It should be used in pregnancy only if the potential benefit justifies the potential risk to the fetus.   Limited data indicates that naltrexone is minimally excreted into breastmilk.

## 2024-03-22 ENCOUNTER — TELEPHONE (OUTPATIENT)
Dept: NEUROLOGY | Facility: MEDICAL CENTER | Age: 54
End: 2024-03-22
Payer: COMMERCIAL

## 2024-03-22 NOTE — TELEPHONE ENCOUNTER
Hi!  I received a VM form [facility] stating that they have been faxing a request for PA on the Glatiramer Acetate 40 MG/ML Solution Prefilled Syringe. Please complete PA.  Call back#: [phone]  Case ID#: [id number]

## 2024-04-26 ENCOUNTER — TELEPHONE (OUTPATIENT)
Dept: NEUROLOGY | Facility: MEDICAL CENTER | Age: 54
End: 2024-04-26
Payer: COMMERCIAL

## 2024-04-26 NOTE — TELEPHONE ENCOUNTER
Prior Authorization for Glatiramer Acetate 40 MG/ML SOSY (Quantity: 12ml, Days: 28) has been submitted via Cover My Meds: Key ([id number] - PA Case ID: [id number])    Insurance: [facility] [employer]    Will follow up in 24-48 business hours.

## 2024-04-26 NOTE — TELEPHONE ENCOUNTER
Prior Authorization for Glatiramer Acetate 40 MG/ML SOSY has been approved for a quantity of 12ml , day supply 28    Prior Authorization reference number: [id number]  Insurance: [employer]  Effective dates: 04/26/2024 - 04/26/2025  Copay: Unsure as RTS - Next fill 05/15/2024     Is patient eligible to fill with [facility]? Yes    Next Steps:  TC rejecting as RTS - LAST FILL [id number] VIA MAIL next fill  [id number]

## 2024-09-03 ENCOUNTER — OFFICE VISIT (OUTPATIENT)
Dept: NEUROLOGY | Facility: MEDICAL CENTER | Age: 54
End: 2024-09-03
Attending: PSYCHIATRY & NEUROLOGY
Payer: COMMERCIAL

## 2024-09-03 VITALS
TEMPERATURE: 97.4 F | HEIGHT: 66 IN | DIASTOLIC BLOOD PRESSURE: 60 MMHG | HEART RATE: 86 BPM | OXYGEN SATURATION: 98 % | BODY MASS INDEX: 22.71 KG/M2 | WEIGHT: 141.31 LBS | SYSTOLIC BLOOD PRESSURE: 110 MMHG

## 2024-09-03 DIAGNOSIS — G25.0 BENIGN ESSENTIAL TREMOR: ICD-10-CM

## 2024-09-03 DIAGNOSIS — G35 MULTIPLE SCLEROSIS (HCC): ICD-10-CM

## 2024-09-03 PROCEDURE — 99212 OFFICE O/P EST SF 10 MIN: CPT | Performed by: PSYCHIATRY & NEUROLOGY

## 2024-09-03 PROCEDURE — 3078F DIAST BP <80 MM HG: CPT | Performed by: PSYCHIATRY & NEUROLOGY

## 2024-09-03 PROCEDURE — 99214 OFFICE O/P EST MOD 30 MIN: CPT | Performed by: PSYCHIATRY & NEUROLOGY

## 2024-09-03 PROCEDURE — 3074F SYST BP LT 130 MM HG: CPT | Performed by: PSYCHIATRY & NEUROLOGY

## 2024-09-03 RX ORDER — GLATIRAMER 40 MG/ML
1 INJECTION, SOLUTION SUBCUTANEOUS
Qty: 11.76 ML | Refills: 11 | Status: SHIPPED | OUTPATIENT
Start: 2024-09-04

## 2024-09-03 ASSESSMENT — PATIENT HEALTH QUESTIONNAIRE - PHQ9: CLINICAL INTERPRETATION OF PHQ2 SCORE: 0

## 2024-09-04 NOTE — PROGRESS NOTES
Chief Complaint   Patient presents with    Follow-Up     1 YR MS- glatopa needs to be sent in today              Problem List Items Addressed This Visit       Multiple sclerosis (HCC)     Patient has done well since the last MS visit.  Patient lives in [place].  She has been taking glatiramer acetate but insurance now prefers Glatopa.  Patient needs new prescription sent to her pharmacy for Glatopa.  Patient denies any new MS symptoms.  She denies any headaches.         Relevant Medications    Glatiramer Acetate (GLATOPA) 40 MG/ML Solution Prefilled Syringe (Start on 9/4/2024)    Benign essential tremor     Patient states her tremor is about stable.  She does have family history in her brother and her grandparents.  She thinks this is genetic tremor not necessarily related to her multiple sclerosis.            History of present illness:  Temi Howard 54 y.o. female presents today for treatment of MS and essential tremor.    Past medical history:   Past Medical History:   Diagnosis Date    Anesthesia     PONV    Anxiety     Cancer (HCC) 2014    thyroid, breast - ductal carcinoma in situ.    Hypertension     hx of, not on any medication currently    MS (multiple sclerosis) (HCC)     Unspecified disorder of thyroid     cancer       Past surgical history:   Past Surgical History:   Procedure Laterality Date    BREAST RECONSTRUCTION Bilateral 3/14/2019    Procedure: BREAST RECONSTRUCTION;  Surgeon: Tee Hayes M.D.;  Location: SURGERY SAME DAY [facility];  Service: Plastics    BREAST IMPLANT REVISION Bilateral 3/14/2019    Procedure: BREAST IMPLANT - REPLACE SOFT TISSUE EXPANDERS WITH PERMANENT IMPLANTS;  Surgeon: Tee Hayes M.D.;  Location: SURGERY SAME DAY [facility];  Service: Plastics    MASTECTOMY Bilateral 11/12/2018    Procedure: MASTECTOMY- SIMPLE;  Surgeon: Ammy Cabrera M.D.;  Location: SURGERY SAME DAY [facility];  Service: General    NODE BIOPSY SENTINEL Right 11/12/2018    Procedure:  NODE BIOPSY SENTINEL- AXILLARY LYMPH;  Surgeon: Ammy Cabrera M.D.;  Location: SURGERY SAME DAY [facility];  Service: General    BREAST RECONSTRUCTION Bilateral 11/12/2018    Procedure: BREAST RECONSTRUCTION;  Surgeon: Magdy Velázquez Jr., M.D.;  Location: SURGERY SAME DAY [facility];  Service: General    TISSUE EXPANDER PLACE/REMOVE Bilateral 11/12/2018    Procedure: TISSUE EXPANDER PLACE/REMOVE;  Surgeon: Magdy Velázquez Jr., M.D.;  Location: SURGERY SAME DAY [facility];  Service: General    MASS EXCISION GENERAL Right 6/9/2015    Procedure: MASS EXCISION GENERAL;  Surgeon: Dat Avery M.D.;  Location: SURGERY [facility];  Service:     THYROIDECTOMY  2/5/2014    Performed by Caitlin Mooney M.D. at SURGERY SAME DAY [facility]    THYROID LOBECTOMY  1/8/2014    Performed by Caitlni Mooney M.D. at SURGERY SAME DAY [facility]    GASTRIC SLEEVE LAPAROSCOPY  4/1/2013    Performed by John H Ganser, M.D. at SURGERY [facility]    HYSTERECTOMY LAPAROSCOPY  2008    OTHER ORTHOPEDIC SURGERY  1989    right knee arthroscopic surgery    OTHER  [id number]    lumps removed from both breasts multiple surgeries       Family history:   Family History   Problem Relation Age of Onset    Heart Disease Other     Hypertension Other     Cancer Other     Stroke Other     Cancer Mother         Breast    Autoimmune Disease Mother         Sjogren's    Heart Disease Father     No Known Problems Brother     No Known Problems Son     No Known Problems Son        Social history:   Social History     Socioeconomic History    Marital status:      Spouse name: Not on file    Number of children: Not on file    Years of education: Not on file    Highest education level: Not on file   Occupational History    Not on file   Tobacco Use    Smoking status: Never    Smokeless tobacco: Never   Vaping Use    Vaping status: Never Used   Substance and Sexual Activity    Alcohol use: No    Drug use: No    Sexual  activity: Yes     Partners: Male   Other Topics Concern    Not on file   Social History Narrative    Not on file     Social Determinants of Health     Financial Resource Strain: Not on file   Food Insecurity: Not on file   Transportation Needs: Not on file   Physical Activity: Not on file   Stress: Not on file   Social Connections: Not on file   Intimate Partner Violence: Not on file   Housing Stability: Not on file       Current medications:   Current Outpatient Medications   Medication    [START ON 9/4/2024] Glatiramer Acetate (GLATOPA) 40 MG/ML Solution Prefilled Syringe    lisinopril (PRINIVIL) 5 MG Tab    Multiple Vitamins-Minerals (AIRBORNE PO)    ascorbic acid (ASCORBIC ACID) 500 MG Tab    levothyroxine (SYNTHROID) 150 MCG Tab    Multiple Vitamins-Minerals (MULTIVITAMIN PO)    Cholecalciferol (VITAMIN D-3 PO)    Calcium-Magnesium-Vitamin D (CALCIUM 500 PO)    Omega 3-6-9 Fatty Acids (OMEGA-3-6-9 PO)     No current facility-administered medications for this visit.       Medication Allergy:  Allergies   Allergen Reactions    Other Food      WALNUTS-sores in mouth       Review of systems:   Constitutional: denies fever, night sweats, weight loss.   Eyes: denies acute vision change, eye pain or secretion.   Ears, Nose, Mouth, Throat: denies nasal secretion, nasal bleeding, difficulty swallowing, hearing loss, tinnitus, vertigo, ear pain, acute dental problems, oral ulcers or lesions.   Endocrine: denies recent weight changes, heat or cold intolerance, polyuria, polydypsia, polyphagia,abnormal hair growth.  Cardiovascular: denies new onset of chest pain, palpitations, syncope, or dyspnea of exertion.  Pulmonary: denies shortness of breath, new onset of cough, hemoptysis, wheezing, chest pain or flu-like symptoms.   GI: denies nausea, vomiting, diarrhea, GI bleeding, change in appetite, abdominal pain, and change in bowel habits.  : denies dysuria, urinary incontinence, hematuria.  Heme/oncology: denies history  "of easy bruising or bleeding. No history of cancer, DVTor PE.  Allergy/immunology: denies hives/urticaria, or itching.   Dermatologic: denies new rash, or new skin lesions.  Musculoskeletal:denies joint swelling or pain, muscle pain, neck and back pain. Neurologic: denies headaches, acute visual changes, facial droopiness, muscle weakness (focal or generalized), paresthesias, anesthesia, ataxia, change in speech or language, memory loss, abnormal movements, seizures, loss of consciousness, or episodes of confusion.   Psychiatric: denies symptoms of depression, anxiety, hallucinations, mood swings or changes, suicidal or homicidal thoughts.     Physical examination:   Vitals:    09/03/24 0928   BP: 110/60   BP Location: Left arm   Patient Position: Sitting   BP Cuff Size: Adult   Pulse: 86   Temp: 36.3 °C (97.4 °F)   TempSrc: Temporal   SpO2: 98%   Weight: 64.1 kg (141 lb 5 oz)   Height: 1.676 m (5' 6\")     General: Patient in no acute distress, pleasant and cooperative.  HEENT: Normocephalic, no signs of acute trauma.   Neck: supple, no meningeal signs or carotid bruits. There is normal range of motion. No tenderness on exam.   Chest: clear to auscultation. No cough.   CV: RRR, no murmurs.   Skin: no signs of acute rashes or trauma.   Musculoskeletal: joints exhibit full range of motion, without any pain to palpation. There are no signs of joint or muscle swelling. There is no tenderness to deep palpation of muscles.   Psychiatric: No hallucinatory behavior. Denies symptoms of depression or suicidal ideation. Mood and affect appear normal on exam.     NEUROLOGICAL EXAM:   Mental status, orientation: Awake, alert and fully oriented.   Speech and language: speech is clear and fluent. The patient is able to name, repeat and comprehend.   Memory: There is intact recollection of recent and remote events.   Cranial nerve exam: Pupils are 3-4 mm bilaterally and equally reactive to light and accommodation. Visual fields are " intact by confrontation. Fundoscopic exam was unremarkable. There is no nystagmus on primary or secondary gaze. Intact full EOM in all directions of gaze. Face appears symmetric. Sensation in the face is intact to light touch. Uvula is midline. Palate elevates symmetrically. Tongue is midline and without any signs of tongue biting or fasciculations. Sternocleidomastoid muscles exhibit is normal strength bilaterally. Shoulder shrug is intact bilaterally.   Motor exam: Strength is 5/5 in all extremities. Tone is normal. Positive hand action tremor abnormal movements were seen on exam.   Sensory exam reveals normal sense of light touch, proprioception, vibration and pinprick in all extremities.   Deep tendon reflexes:  3+ throughout. Plantar responses are flexor. There is no clonus.   Coordination: shows a normal finger-nose-finger. Normal rapidly alternating movements.   Gait: The patient was able to get up from seated position on first attempt without requiring assistance. Found to be steady when walking. Movements were fluid with normal arm swing. The patient was able to turn without difficulties or tendency to fall. Romberg examination positive      ANCILLARY DATA REVIEWED:     Lab Data Review:  No results found for this or any previous visit (from the past 24 hour(s)).    Records reviewed: Medical records since last visit reviewed out of [place].      Imaging: Last neuroimaging reviewed consistent with multiple sclerosis with lesions in the brain and the spinal cord.          ASSESSMENT AND PLAN:    1. Multiple sclerosis (HCC)  Plan at this time is to refill her prescription with Glatopa at her specialty pharmacy.  We discussed if she has any new MS symptoms to let me know and we could image at that time.  Because patient is stable I will not order new neuroimages today.  - Glatiramer Acetate (GLATOPA) 40 MG/ML Solution Prefilled Syringe; Inject 1 Syringe under the skin every Monday, Wednesday, and Friday.   Dispense: 11.76 mL; Refill: 11    2. Benign essential tremor  We discussed beta-blockers but patient states that this is not bothering her extensively now.  She has low blood pressure and heart rate so we will not start a beta-blocker today.        FOLLOW-UP:   Return in about 1 year (around 9/3/2025).      My total time spent caring for the patient on the day of the encounter was 30 minutes.   This does not include time spent on separately billable procedures/tests.     EDUCATION AND COUNSELING:  -Discussed regular exercise program and prevention of cardiovascular disease, including stroke.   -Discussed healthy lifestyle, including: healthy diet (rich in fruits, vegetables, nuts and healthy oils); proper hydration, and adequate sleep hygiene (allowing 7-8 hrs of overnight sleep).        Melissa Bloch, MD  Clinical  of Neurology [facility].   Diplomate in Neurology.   Office: [phone]  Fax: [phone]

## 2024-09-04 NOTE — ASSESSMENT & PLAN NOTE
Patient has done well since the last MS visit.  Patient lives in [place].  She has been taking glatiramer acetate but insurance now prefers Glatopa.  Patient needs new prescription sent to her pharmacy for Glatopa.  Patient denies any new MS symptoms.  She denies any headaches.

## 2024-09-04 NOTE — ASSESSMENT & PLAN NOTE
Patient states her tremor is about stable.  She does have family history in her brother and her grandparents.  She thinks this is genetic tremor not necessarily related to her multiple sclerosis.

## 2025-01-20 ENCOUNTER — APPOINTMENT (OUTPATIENT)
Dept: URBAN - METROPOLITAN AREA CLINIC 35 | Facility: CLINIC | Age: 55
Setting detail: DERMATOLOGY
End: 2025-01-20

## 2025-01-20 DIAGNOSIS — L82.1 OTHER SEBORRHEIC KERATOSIS: ICD-10-CM

## 2025-01-20 DIAGNOSIS — D18.0 HEMANGIOMA: ICD-10-CM

## 2025-01-20 DIAGNOSIS — Z71.89 OTHER SPECIFIED COUNSELING: ICD-10-CM

## 2025-01-20 DIAGNOSIS — Z79.899 OTHER LONG TERM (CURRENT) DRUG THERAPY: ICD-10-CM

## 2025-01-20 DIAGNOSIS — D22 MELANOCYTIC NEVI: ICD-10-CM

## 2025-01-20 DIAGNOSIS — L65.9 NONSCARRING HAIR LOSS, UNSPECIFIED: ICD-10-CM | Status: INADEQUATELY CONTROLLED

## 2025-01-20 DIAGNOSIS — L81.4 OTHER MELANIN HYPERPIGMENTATION: ICD-10-CM

## 2025-01-20 PROBLEM — D22.5 MELANOCYTIC NEVI OF TRUNK: Status: ACTIVE | Noted: 2025-01-20

## 2025-01-20 PROBLEM — D18.01 HEMANGIOMA OF SKIN AND SUBCUTANEOUS TISSUE: Status: ACTIVE | Noted: 2025-01-20

## 2025-01-20 PROCEDURE — ? SUNSCREEN RECOMMENDATIONS

## 2025-01-20 PROCEDURE — ? HIGH RISK MEDICATION MONITORING

## 2025-01-20 PROCEDURE — ? TREATMENT REGIMEN

## 2025-01-20 PROCEDURE — ? COUNSELING

## 2025-01-20 PROCEDURE — ? ADDITIONAL NOTES

## 2025-01-20 PROCEDURE — 99214 OFFICE O/P EST MOD 30 MIN: CPT

## 2025-01-20 PROCEDURE — ? PRESCRIPTION

## 2025-01-20 RX ORDER — FINASTERIDE 1 MG/1
1 TABLET, FILM COATED ORAL QD
Qty: 90 | Refills: 4 | Status: ERX | COMMUNITY
Start: 2025-01-20

## 2025-01-20 RX ORDER — MINOXIDIL 2.5 MG/1
1 TABLET ORAL QD
Qty: 30 | Refills: 6 | Status: ERX

## 2025-01-20 RX ADMIN — FINASTERIDE 1: 1 TABLET, FILM COATED ORAL at 00:00

## 2025-01-20 ASSESSMENT — LOCATION DETAILED DESCRIPTION DERM
LOCATION DETAILED: RIGHT SUPERIOR MEDIAL UPPER BACK
LOCATION DETAILED: SUPERIOR THORACIC SPINE

## 2025-01-20 ASSESSMENT — LOCATION SIMPLE DESCRIPTION DERM
LOCATION SIMPLE: UPPER BACK
LOCATION SIMPLE: RIGHT UPPER BACK

## 2025-01-20 ASSESSMENT — LOCATION ZONE DERM: LOCATION ZONE: TRUNK

## 2025-01-20 NOTE — PROCEDURE: HIGH RISK MEDICATION MONITORING
Spevigo Counseling: I discussed with the patient the risks of Spevigo including but not limited to fatigue, nasuea, vomiting, headache, pruritus, urinary tract infection, an infusion related reactions.  The patient understands that monitoring is required including screening for tuberculosis at baseline and yearly screening thereafter while continuing Spevigo therapy. They should contact us if symptoms of infection or other concerning signs are noted.
Topical Retinoid Pregnancy And Lactation Text: This medication is Pregnancy Category C. It is unknown if this medication is excreted in breast milk.
Thalidomide Pregnancy And Lactation Text: This medication is Pregnancy Category X and is absolutely contraindicated during pregnancy. It is unknown if it is excreted in breast milk.
Low Dose Naltrexone Pregnancy And Lactation Text: Naltrexone is pregnancy category C.  There have been no adequate and well-controlled studies in pregnant women.  It should be used in pregnancy only if the potential benefit justifies the potential risk to the fetus.   Limited data indicates that naltrexone is minimally excreted into breastmilk.
Dapsone Counseling: I discussed with the patient the risks of dapsone including but not limited to hemolytic anemia, agranulocytosis, rashes, methemoglobinemia, kidney failure, peripheral neuropathy, headaches, GI upset, and liver toxicity.  Patients who start dapsone require monitoring including baseline LFTs and weekly CBCs for the first month, then every month thereafter.  The patient verbalized understanding of the proper use and possible adverse effects of dapsone.  All of the patient's questions and concerns were addressed.
Cibinqo Pregnancy And Lactation Text: It is unknown if this medication will adversely affect pregnancy or breast feeding.  You should not take this medication if you are currently pregnant or planning a pregnancy or while breastfeeding.
Wartpeel Counseling:  I discussed with the patient the risks of Wartpeel including but not limited to erythema, scaling, itching, weeping, crusting, and pain.
Azelaic Acid Counseling: Patient counseled that medicine may cause skin irritation and to avoid applying near the eyes.  In the event of skin irritation, the patient was advised to reduce the amount of the drug applied or use it less frequently.   The patient verbalized understanding of the proper use and possible adverse effects of azelaic acid.  All of the patient's questions and concerns were addressed.
Otezla Counseling: The side effects of Otezla were discussed with the patient, including but not limited to worsening or new depression, weight loss, diarrhea, nausea, upper respiratory tract infection, and headache. Patient instructed to call the office should any adverse effect occur.  The patient verbalized understanding of the proper use and possible adverse effects of Otezla.  All the patient's questions and concerns were addressed.
Clindamycin Counseling: I counseled the patient regarding use of clindamycin as an antibiotic for prophylactic and/or therapeutic purposes. Clindamycin is active against numerous classes of bacteria, including skin bacteria. Side effects may include nausea, diarrhea, gastrointestinal upset, rash, hives, yeast infections, and in rare cases, colitis.
Tetracycline Pregnancy And Lactation Text: This medication is Pregnancy Category D and not consider safe during pregnancy. It is also excreted in breast milk.
Hydroxyzine Pregnancy And Lactation Text: This medication is not safe during pregnancy and should not be taken. It is also excreted in breast milk and breast feeding isn't recommended.
Ketoconazole Pregnancy And Lactation Text: This medication is Pregnancy Category C and it isn't know if it is safe during pregnancy. It is also excreted in breast milk and breast feeding isn't recommended.
Include Pregnancy/Lactation Warning?: No
Birth Control Pills Counseling: Birth Control Pill Counseling: I discussed with the patient the potential side effects of OCPs including but not limited to increased risk of stroke, heart attack, thrombophlebitis, deep venous thrombosis, hepatic adenomas, breast changes, GI upset, headaches, and depression.  The patient verbalized understanding of the proper use and possible adverse effects of OCPs. All of the patient's questions and concerns were addressed.
Imiquimod Counseling:  I discussed with the patient the risks of imiquimod including but not limited to erythema, scaling, itching, weeping, crusting, and pain.  Patient understands that the inflammatory response to imiquimod is variable from person to person and was educated regarded proper titration schedule.  If flu-like symptoms develop, patient knows to discontinue the medication and contact us.
Cyclophosphamide Pregnancy And Lactation Text: This medication is Pregnancy Category D and it isn't considered safe during pregnancy. This medication is excreted in breast milk.
5-Fu Pregnancy And Lactation Text: This medication is Pregnancy Category X and contraindicated in pregnancy and in women who may become pregnant. It is unknown if this medication is excreted in breast milk.
Arava Counseling:  Patient counseled regarding adverse effects of Arava including but not limited to nausea, vomiting, abnormalities in liver function tests. Patients may develop mouth sores, rash, diarrhea, and abnormalities in blood counts. The patient understands that monitoring is required including LFTs and blood counts.  There is a rare possibility of scarring of the liver and lung problems that can occur when taking methotrexate. Persistent nausea, loss of appetite, pale stools, dark urine, cough, and shortness of breath should be reported immediately. Patient advised to discontinue Arava treatment and consult with a physician prior to attempting conception. The patient will have to undergo a treatment to eliminate Arava from the body prior to conception.
Ebglyss Pregnancy And Lactation Text: This medication likely crosses the placenta but the risk for the fetus is uncertain. It is unknown if this medication is excreted in breast milk.
Bimzelx Pregnancy And Lactation Text: This medication crosses the placenta and the safety is uncertain during pregnancy. It is unknown if this medication is present in breast milk.
Xeljanz Counseling: I discussed with the patient the risks of Xeljanz therapy including increased risk of infection, liver issues, headache, diarrhea, or cold symptoms. Live vaccines should be avoided. They were instructed to call if they have any problems.
Tazorac Counseling:  Patient advised that medication is irritating and drying.  Patient may need to apply sparingly and wash off after an hour before eventually leaving it on overnight.  The patient verbalized understanding of the proper use and possible adverse effects of tazorac.  All of the patient's questions and concerns were addressed.
Niacinamide Counseling: I recommended taking niacin or niacinamide, also know as vitamin B3, twice daily. Recent evidence suggests that taking vitamin B3 (500 mg twice daily) can reduce the risk of actinic keratoses and non-melanoma skin cancers. Side effects of vitamin B3 include flushing and headache.
Xolair Pregnancy And Lactation Text: This medication is Pregnancy Category B and is considered safe during pregnancy. This medication is excreted in breast milk.
Acitretin Counseling:  I discussed with the patient the risks of acitretin including but not limited to hair loss, dry lips/skin/eyes, liver damage, hyperlipidemia, depression/suicidal ideation, photosensitivity.  Serious rare side effects can include but are not limited to pancreatitis, pseudotumor cerebri, bony changes, clot formation/stroke/heart attack.  Patient understands that alcohol is contraindicated since it can result in liver toxicity and significantly prolong the elimination of the drug by many years.
Dapsone Pregnancy And Lactation Text: This medication is Pregnancy Category C and is not considered safe during pregnancy or breast feeding.
Siliq Counseling:  I discussed with the patient the risks of Siliq including but not limited to new or worsening depression, suicidal thoughts and behavior, immunosuppression, malignancy, posterior leukoencephalopathy syndrome, and serious infections.  The patient understands that monitoring is required including a PPD at baseline and must alert us or the primary physician if symptoms of infection or other concerning signs are noted. There is also a special program designed to monitor depression which is required with Siliq.
Clindamycin Pregnancy And Lactation Text: This medication can be used in pregnancy if certain situations. Clindamycin is also present in breast milk.
Litfulo Counseling: Litfulo (Ritlecitinib) Counseling: I discussed with the patient the risks of Litfulo therapy including but not limited to headache, upper respiratory infections, nausea, diarrhea, acne, and urticaria. Live vaccines should be avoided.  This medication has been linked to serious infections; higher rate of mortality; malignancy and lymphoproliferative disorders; major adverse cardiovascular events; thrombosis; decreases in lymphocytes and platelets; liver enzyme elevations; and CPK elevations.
Infliximab Counseling:  I discussed with the patient the risks of infliximab including but not limited to myelosuppression, immunosuppression, autoimmune hepatitis, demyelinating diseases, lymphoma, and serious infections.  The patient understands that monitoring is required including a PPD at baseline and must alert us or the primary physician if symptoms of infection or other concerning signs are noted.
Azelaic Acid Pregnancy And Lactation Text: This medication is considered safe during pregnancy and breast feeding.
Drysol Counseling:  I discussed with the patient the risks of drysol/aluminum chloride including but not limited to skin rash, itching, irritation, burning.
Enbrel Counseling:  I discussed with the patient the risks of etanercept including but not limited to myelosuppression, immunosuppression, autoimmune hepatitis, demyelinating diseases, lymphoma, and infections.  The patient understands that monitoring is required including a PPD at baseline and must alert us or the primary physician if symptoms of infection or other concerning signs are noted.
Otezla Pregnancy And Lactation Text: This medication is Pregnancy Category C and it isn't known if it is safe during pregnancy. It is unknown if it is excreted in breast milk.
Terbinafine Counseling: Patient counseling regarding adverse effects of terbinafine including but not limited to headache, diarrhea, rash, upset stomach, liver function test abnormalities, itching, taste/smell disturbance, nausea, abdominal pain, and flatulence.  There is a rare possibility of liver failure that can occur when taking terbinafine.  The patient understands that a baseline LFT and kidney function test may be required. The patient verbalized understanding of the proper use and possible adverse effects of terbinafine.  All of the patient's questions and concerns were addressed.
Tranexamic Acid Counseling:  Patient advised of the small risk of bleeding problems with tranexamic acid. They were also instructed to call if they developed any nausea, vomiting or diarrhea. All of the patient's questions and concerns were addressed.
Spevigo Pregnancy And Lactation Text: The risk during pregnancy and breastfeeding is uncertain with this medication. This medication does cross the placenta. It is unknown if this medication is found in breast milk.
Birth Control Pills Pregnancy And Lactation Text: This medication should be avoided if pregnant and for the first 30 days post-partum.
Acitretin Pregnancy And Lactation Text: This medication is Pregnancy Category X and should not be given to women who are pregnant or may become pregnant in the future. This medication is excreted in breast milk.
Gabapentin Counseling: I discussed with the patient the risks of gabapentin including but not limited to dizziness, somnolence, fatigue and ataxia.
Siliq Pregnancy And Lactation Text: The risk during pregnancy and breastfeeding is uncertain with this medication.
Oxybutynin Counseling:  I discussed with the patient the risks of oxybutynin including but not limited to skin rash, drowsiness, dry mouth, difficulty urinating, and blurred vision.
Tazorac Pregnancy And Lactation Text: This medication is not safe during pregnancy. It is unknown if this medication is excreted in breast milk.
Niacinamide Pregnancy And Lactation Text: These medications are considered safe during pregnancy.
Cimzia Counseling:  I discussed with the patient the risks of Cimzia including but not limited to immunosuppression, allergic reactions and infections.  The patient understands that monitoring is required including a PPD at baseline and must alert us or the primary physician if symptoms of infection or other concerning signs are noted.
Quinolones Counseling:  I discussed with the patient the risks of fluoroquinolones including but not limited to GI upset, allergic reaction, drug rash, diarrhea, dizziness, photosensitivity, yeast infections, liver function test abnormalities, tendonitis/tendon rupture.
Protopic Counseling: Patient may experience a mild burning sensation during topical application. Protopic is not approved in children less than 2 years of age. There have been case reports of hematologic and skin malignancies in patients using topical calcineurin inhibitors although causality is questionable.
Fluconazole Counseling:  Patient counseled regarding adverse effects of fluconazole including but not limited to headache, diarrhea, nausea, upset stomach, liver function test abnormalities, taste disturbance, and stomach pain.  There is a rare possibility of liver failure that can occur when taking fluconazole.  The patient understands that monitoring of LFTs and kidney function test may be required, especially at baseline. The patient verbalized understanding of the proper use and possible adverse effects of fluconazole.  All of the patient's questions and concerns were addressed.
Cyclosporine Counseling:  I discussed with the patient the risks of cyclosporine including but not limited to hypertension, gingival hyperplasia,myelosuppression, immunosuppression, liver damage, kidney damage, neurotoxicity, lymphoma, and serious infections. The patient understands that monitoring is required including baseline blood pressure, CBC, CMP, lipid panel and uric acid, and then 1-2 times monthly CMP and blood pressure.
Albendazole Counseling:  I discussed with the patient the risks of albendazole including but not limited to cytopenia, kidney damage, nausea/vomiting and severe allergy.  The patient understands that this medication is being used in an off-label manner.
Winlevi Counseling:  I discussed with the patient the risks of topical clascoterone including but not limited to erythema, scaling, itching, and stinging. Patient voiced their understanding.
Terbinafine Pregnancy And Lactation Text: This medication is Pregnancy Category B and is considered safe during pregnancy. It is also excreted in breast milk and breast feeding isn't recommended.
Doxycycline Counseling:  Patient counseled regarding possible photosensitivity and increased risk for sunburn.  Patient instructed to avoid sunlight, if possible.  When exposed to sunlight, patients should wear protective clothing, sunglasses, and sunscreen.  The patient was instructed to call the office immediately if the following severe adverse effects occur:  hearing changes, easy bruising/bleeding, severe headache, or vision changes.  The patient verbalized understanding of the proper use and possible adverse effects of doxycycline.  All of the patient's questions and concerns were addressed.
Litfulo Pregnancy And Lactation Text: There is insufficient data to evaluate whether or not Litfulo is safe to use during pregnancy.  Breastfeeding is not recommended during treatment.
Infliximab Pregnancy And Lactation Text: This medication is Pregnancy Category B and is considered safe during pregnancy. It is unknown if this medication is excreted in breast milk.
Tranexamic Acid Pregnancy And Lactation Text: It is unknown if this medication is safe during pregnancy or breast feeding.
Spironolactone Counseling: Patient advised regarding risks of diarrhea, abdominal pain, hyperkalemia, birth defects (for female patients), liver toxicity and renal toxicity. The patient may need blood work to monitor liver and kidney function and potassium levels while on therapy. The patient verbalized understanding of the proper use and possible adverse effects of spironolactone.  All of the patient's questions and concerns were addressed.
Erivedge Counseling- I discussed with the patient the risks of Erivedge including but not limited to nausea, vomiting, diarrhea, constipation, weight loss, changes in the sense of taste, decreased appetite, muscle spasms, and hair loss.  The patient verbalized understanding of the proper use and possible adverse effects of Erivedge.  All of the patient's questions and concerns were addressed.
Stelara Counseling:  I discussed with the patient the risks of ustekinumab including but not limited to immunosuppression, malignancy, posterior leukoencephalopathy syndrome, and serious infections.  The patient understands that monitoring is required including a PPD at baseline and must alert us or the primary physician if symptoms of infection or other concerning signs are noted.
Benzoyl Peroxide Counseling: Patient counseled that medicine may cause skin irritation and bleach clothing.  In the event of skin irritation, the patient was advised to reduce the amount of the drug applied or use it less frequently.   The patient verbalized understanding of the proper use and possible adverse effects of benzoyl peroxide.  All of the patient's questions and concerns were addressed.
Gabapentin Pregnancy And Lactation Text: This medication is Pregnancy Category C and isn't considered safe during pregnancy. It is excreted in breast milk.
Bexarotene Counseling:  I discussed with the patient the risks of bexarotene including but not limited to hair loss, dry lips/skin/eyes, liver abnormalities, hyperlipidemia, pancreatitis, depression/suicidal ideation, photosensitivity, drug rash/allergic reactions, hypothyroidism, anemia, leukopenia, infection, cataracts, and teratogenicity.  Patient understands that they will need regular blood tests to check lipid profile, liver function tests, white blood cell count, thyroid function tests and pregnancy test if applicable.
Simlandi Counseling:  I discussed with the patient the risks of adalimumab including but not limited to myelosuppression, immunosuppression, autoimmune hepatitis, demyelinating diseases, lymphoma, and serious infections.  The patient understands that monitoring is required including a PPD at baseline and must alert us or the primary physician if symptoms of infection or other concerning signs are noted.
Oxybutynin Pregnancy And Lactation Text: This medication is Pregnancy Category B and is considered safe during pregnancy. It is unknown if it is excreted in breast milk.
Dutasteride Male Counseling: Dustasteride Counseling:  I discussed with the patient the risks of use of dutasteride including but not limited to decreased libido, decreased ejaculate volume, and gynecomastia. Women who can become pregnant should not handle medication.  All of the patient's questions and concerns were addressed.
Quinolones Pregnancy And Lactation Text: This medication is Pregnancy Category C and it isn't know if it is safe during pregnancy. It is also excreted in breast milk.
Spironolactone Pregnancy And Lactation Text: This medication can cause feminization of the male fetus and should be avoided during pregnancy. The active metabolite is also found in breast milk.
Minoxidil Counseling: Minoxidil is a topical medication which can increase blood flow where it is applied. It is uncertain how this medication increases hair growth. Side effects are uncommon and include stinging and allergic reactions.
Cyclosporine Pregnancy And Lactation Text: This medication is Pregnancy Category C and it isn't know if it is safe during pregnancy. This medication is excreted in breast milk.
Cimzia Pregnancy And Lactation Text: This medication crosses the placenta but can be considered safe in certain situations. Cimzia may be excreted in breast milk.
Albendazole Pregnancy And Lactation Text: This medication is Pregnancy Category C and it isn't known if it is safe during pregnancy. It is also excreted in breast milk.
Topical Clindamycin Counseling: Patient counseled that this medication may cause skin irritation or allergic reactions.  In the event of skin irritation, the patient was advised to reduce the amount of the drug applied or use it less frequently.   The patient verbalized understanding of the proper use and possible adverse effects of clindamycin.  All of the patient's questions and concerns were addressed.
Nsaids Counseling: NSAID Counseling: I discussed with the patient that NSAIDs should be taken with food. Prolonged use of NSAIDs can result in the development of stomach ulcers.  Patient advised to stop taking NSAIDs if abdominal pain occurs.  The patient verbalized understanding of the proper use and possible adverse effects of NSAIDs.  All of the patient's questions and concerns were addressed.
Protopic Pregnancy And Lactation Text: This medication is Pregnancy Category C. It is unknown if this medication is excreted in breast milk when applied topically.
Doxycycline Pregnancy And Lactation Text: This medication is Pregnancy Category D and not consider safe during pregnancy. It is also excreted in breast milk but is considered safe for shorter treatment courses.
Benzoyl Peroxide Pregnancy And Lactation Text: This medication is Pregnancy Category C. It is unknown if benzoyl peroxide is excreted in breast milk.
Winlevi Pregnancy And Lactation Text: This medication is considered safe during pregnancy and breastfeeding.
Bexarotene Pregnancy And Lactation Text: This medication is Pregnancy Category X and should not be given to women who are pregnant or may become pregnant. This medication should not be used if you are breast feeding.
Valtrex Counseling: I discussed with the patient the risks of valacyclovir including but not limited to kidney damage, nausea, vomiting and severe allergy.  The patient understands that if the infection seems to be worsening or is not improving, they are to call.
Minoxidil Pregnancy And Lactation Text: This medication has not been assigned a Pregnancy Risk Category but animal studies failed to show danger with the topical medication. It is unknown if the medication is excreted in breast milk.
Elidel Counseling: Patient may experience a mild burning sensation during topical application. Elidel is not approved in children less than 2 years of age. There have been case reports of hematologic and skin malignancies in patients using topical calcineurin inhibitors although causality is questionable.
Olumiant Counseling: I discussed with the patient the risks of Olumiant therapy including but not limited to upper respiratory tract infections, shingles, cold sores, and nausea. Live vaccines should be avoided.  This medication has been linked to serious infections; higher rate of mortality; malignancy and lymphoproliferative disorders; major adverse cardiovascular events; thrombosis; gastrointestinal perforations; neutropenia; lymphopenia; anemia; liver enzyme elevations; and lipid elevations.
Humira Counseling:  I discussed with the patient the risks of adalimumab including but not limited to myelosuppression, immunosuppression, autoimmune hepatitis, demyelinating diseases, lymphoma, and serious infections.  The patient understands that monitoring is required including a PPD at baseline and must alert us or the primary physician if symptoms of infection or other concerning signs are noted.
Ivermectin Counseling:  Patient instructed to take medication on an empty stomach with a full glass of water.  Patient informed of potential adverse effects including but not limited to nausea, diarrhea, dizziness, itching, and swelling of the extremities or lymph nodes.  The patient verbalized understanding of the proper use and possible adverse effects of ivermectin.  All of the patient's questions and concerns were addressed.
Propranolol Counseling:  I discussed with the patient the risks of propranolol including but not limited to low heart rate, low blood pressure, low blood sugar, restlessness and increased cold sensitivity. They should call the office if they experience any of these side effects.
Cimetidine Counseling:  I discussed with the patient the risks of Cimetidine including but not limited to gynecomastia, headache, diarrhea, nausea, drowsiness, arrhythmias, pancreatitis, skin rashes, psychosis, bone marrow suppression and kidney toxicity.
Cosentyx Counseling:  I discussed with the patient the risks of Cosentyx including but not limited to worsening of Crohn's disease, immunosuppression, allergic reactions and infections.  The patient understands that monitoring is required including a PPD at baseline and must alert us or the primary physician if symptoms of infection or other concerning signs are noted.
Valtrex Pregnancy And Lactation Text: this medication is Pregnancy Category B and is considered safe during pregnancy. This medication is not directly found in breast milk but it's metabolite acyclovir is present.
Nsaids Pregnancy And Lactation Text: These medications are considered safe up to 30 weeks gestation. It is excreted in breast milk.
Rifampin Counseling: I discussed with the patient the risks of rifampin including but not limited to liver damage, kidney damage, red-orange body fluids, nausea/vomiting and severe allergy.
Rhofade Counseling: Rhofade is a topical medication which can decrease superficial blood flow where applied. Side effects are uncommon and include stinging, redness and allergic reactions.
Griseofulvin Counseling:  I discussed with the patient the risks of griseofulvin including but not limited to photosensitivity, cytopenia, liver damage, nausea/vomiting and severe allergy.  The patient understands that this medication is best absorbed when taken with a fatty meal (e.g., ice cream or french fries).
Glycopyrrolate Counseling:  I discussed with the patient the risks of glycopyrrolate including but not limited to skin rash, drowsiness, dry mouth, difficulty urinating, and blurred vision.
Methotrexate Counseling:  Patient counseled regarding adverse effects of methotrexate including but not limited to nausea, vomiting, abnormalities in liver function tests. Patients may develop mouth sores, rash, diarrhea, and abnormalities in blood counts. The patient understands that monitoring is required including LFT's and blood counts.  There is a rare possibility of scarring of the liver and lung problems that can occur when taking methotrexate. Persistent nausea, loss of appetite, pale stools, dark urine, cough, and shortness of breath should be reported immediately. Patient advised to discontinue methotrexate treatment at least three months before attempting to become pregnant.  I discussed the need for folate supplements while taking methotrexate.  These supplements can decrease side effects during methotrexate treatment. The patient verbalized understanding of the proper use and possible adverse effects of methotrexate.  All of the patient's questions and concerns were addressed.
Dutasteride Female Counseling: Dutasteride Counseling:  I discussed with the patient the risks of use of dutasteride including but not limited to decreased libido and sexual dysfunction. Explained the teratogenic nature of the medication and stressed the importance of not getting pregnant during treatment. All of the patient's questions and concerns were addressed.
Carac Counseling:  I discussed with the patient the risks of Carac including but not limited to erythema, scaling, itching, weeping, crusting, and pain.
Azathioprine Counseling:  I discussed with the patient the risks of azathioprine including but not limited to myelosuppression, immunosuppression, hepatotoxicity, lymphoma, and infections.  The patient understands that monitoring is required including baseline LFTs, Creatinine, possible TPMP genotyping and weekly CBCs for the first month and then every 2 weeks thereafter.  The patient verbalized understanding of the proper use and possible adverse effects of azathioprine.  All of the patient's questions and concerns were addressed.
Taltz Counseling: I discussed with the patient the risks of ixekizumab including but not limited to immunosuppression, serious infections, worsening of inflammatory bowel disease and drug reactions.  The patient understands that monitoring is required including a PPD at baseline and must alert us or the primary physician if symptoms of infection or other concerning signs are noted.
Methotrexate Pregnancy And Lactation Text: This medication is Pregnancy Category X and is known to cause fetal harm. This medication is excreted in breast milk.
Erythromycin Counseling:  I discussed with the patient the risks of erythromycin including but not limited to GI upset, allergic reaction, drug rash, diarrhea, increase in liver enzymes, and yeast infections.
Zyclara Counseling:  I discussed with the patient the risks of imiquimod including but not limited to erythema, scaling, itching, weeping, crusting, and pain.  Patient understands that the inflammatory response to imiquimod is variable from person to person and was educated regarded proper titration schedule.  If flu-like symptoms develop, patient knows to discontinue the medication and contact us.
Olumiant Pregnancy And Lactation Text: Based on animal studies, Olumiant may cause embryo-fetal harm when administered to pregnant women.  The medication should not be used in pregnancy.  Breastfeeding is not recommended during treatment.
Mirvaso Counseling: Mirvaso is a topical medication which can decrease superficial blood flow where applied. Side effects are uncommon and include stinging, redness and allergic reactions.
Libtayo Counseling- I discussed with the patient the risks of Libtayo including but not limited to nausea, vomiting, diarrhea, and bone or muscle pain.  The patient verbalized understanding of the proper use and possible adverse effects of Libtayo.  All of the patient's questions and concerns were addressed.
Clofazimine Counseling:  I discussed with the patient the risks of clofazimine including but not limited to skin and eye pigmentation, liver damage, nausea/vomiting, gastrointestinal bleeding and allergy.
Rifampin Pregnancy And Lactation Text: This medication is Pregnancy Category C and it isn't know if it is safe during pregnancy. It is also excreted in breast milk and should not be used if you are breast feeding.
Rhofade Pregnancy And Lactation Text: This medication has not been assigned a Pregnancy Risk Category. It is unknown if the medication is excreted in breast milk.
Topical Ketoconazole Counseling: Patient counseled that this medication may cause skin irritation or allergic reactions.  In the event of skin irritation, the patient was advised to reduce the amount of the drug applied or use it less frequently.   The patient verbalized understanding of the proper use and possible adverse effects of ketoconazole.  All of the patient's questions and concerns were addressed.
Griseofulvin Pregnancy And Lactation Text: This medication is Pregnancy Category X and is known to cause serious birth defects. It is unknown if this medication is excreted in breast milk but breast feeding should be avoided.
Olanzapine Counseling- I discussed with the patient the common side effects of olanzapine including but are not limited to: lack of energy, dry mouth, increased appetite, sleepiness, tremor, constipation, dizziness, changes in behavior, or restlessness.  Explained that teenagers are more likely to experience headaches, abdominal pain, pain in the arms or legs, tiredness, and sleepiness.  Serious side effects include but are not limited: increased risk of death in elderly patients who are confused, have memory loss, or dementia-related psychosis; hyperglycemia; increased cholesterol and triglycerides; and weight gain.
Glycopyrrolate Pregnancy And Lactation Text: This medication is Pregnancy Category B and is considered safe during pregnancy. It is unknown if it is excreted breast milk.
Isotretinoin Counseling: Patient should get monthly blood tests, not donate blood, not drive at night if vision affected, not share medication, and not undergo elective surgery for 6 months after tx completed. Side effects reviewed, pt to contact office should one occur.
Simponi Counseling:  I discussed with the patient the risks of golimumab including but not limited to myelosuppression, immunosuppression, autoimmune hepatitis, demyelinating diseases, lymphoma, and serious infections.  The patient understands that monitoring is required including a PPD at baseline and must alert us or the primary physician if symptoms of infection or other concerning signs are noted.
Libtayo Pregnancy And Lactation Text: This medication is contraindicated in pregnancy and when breast feeding.
Azithromycin Counseling:  I discussed with the patient the risks of azithromycin including but not limited to GI upset, allergic reaction, drug rash, diarrhea, and yeast infections.
Nemluvio Counseling: I discussed with the patient the risks of nemolizumab including but not limited to headache, gastrointestinal complaints, nasopharyngitis, musculoskeletal complaints, injection site reactions, and allergic reactions. The patient understands that monitoring is required and they must alert us or the primary physician if any side effects are noted.
Eucrisa Counseling: Patient may experience a mild burning sensation during topical application. Eucrisa is not approved in children less than 2 years of age.
Propranolol Pregnancy And Lactation Text: This medication is Pregnancy Category C and it isn't known if it is safe during pregnancy. It is excreted in breast milk.
Dutasteride Pregnancy And Lactation Text: This medication is absolutely contraindicated in women, especially during pregnancy and breast feeding. Feminization of male fetuses is possible if taking while pregnant.
Prednisone Counseling:  I discussed with the patient the risks of prolonged use of prednisone including but not limited to weight gain, insomnia, osteoporosis, mood changes, diabetes, susceptibility to infection, glaucoma and high blood pressure.  In cases where prednisone use is prolonged, patients should be monitored with blood pressure checks, serum glucose levels and an eye exam.  Additionally, the patient may need to be placed on GI prophylaxis, PCP prophylaxis, and calcium and vitamin D supplementation and/or a bisphosphonate.  The patient verbalized understanding of the proper use and the possible adverse effects of prednisone.  All of the patient's questions and concerns were addressed.
Erythromycin Pregnancy And Lactation Text: This medication is Pregnancy Category B and is considered safe during pregnancy. It is also excreted in breast milk.
Rinvoq Counseling: I discussed with the patient the risks of Rinvoq therapy including but not limited to upper respiratory tract infections, shingles, cold sores, bronchitis, nausea, cough, fever, acne, and headache. Live vaccines should be avoided.  This medication has been linked to serious infections; higher rate of mortality; malignancy and lymphoproliferative disorders; major adverse cardiovascular events; thrombosis; thrombocytopenia, anemia, and neutropenia; lipid elevations; liver enzyme elevations; and gastrointestinal perforations.
Solaraze Counseling:  I discussed with the patient the risks of Solaraze including but not limited to erythema, scaling, itching, weeping, crusting, and pain.
Sarecycline Counseling: Patient advised regarding possible photosensitivity and discoloration of the teeth, skin, lips, tongue and gums.  Patient instructed to avoid sunlight, if possible.  When exposed to sunlight, patients should wear protective clothing, sunglasses, and sunscreen.  The patient was instructed to call the office immediately if the following severe adverse effects occur:  hearing changes, easy bruising/bleeding, severe headache, or vision changes.  The patient verbalized understanding of the proper use and possible adverse effects of sarecycline.  All of the patient's questions and concerns were addressed.
Doxepin Counseling:  Patient advised that the medication is sedating and not to drive a car after taking this medication. Patient informed of potential adverse effects including but not limited to dry mouth, urinary retention, and blurry vision.  The patient verbalized understanding of the proper use and possible adverse effects of doxepin.  All of the patient's questions and concerns were addressed.
Itraconazole Counseling:  I discussed with the patient the risks of itraconazole including but not limited to liver damage, nausea/vomiting, neuropathy, and severe allergy.  The patient understands that this medication is best absorbed when taken with acidic beverages such as non-diet cola or ginger ale.  The patient understands that monitoring is required including baseline LFTs and repeat LFTs at intervals.  The patient understands that they are to contact us or the primary physician if concerning signs are noted.
Olanzapine Pregnancy And Lactation Text: This medication is pregnancy category C.   There are no adequate and well controlled trials with olanzapine in pregnant females.  Olanzapine should be used during pregnancy only if the potential benefit justifies the potential risk to the fetus.   In a study in lactating healthy women, olanzapine was excreted in breast milk.  It is recommended that women taking olanzapine should not breast feed.
Hydroxychloroquine Counseling:  I discussed with the patient that a baseline ophthalmologic exam is needed at the start of therapy and every year thereafter while on therapy. A CBC may also be warranted for monitoring.  The side effects of this medication were discussed with the patient, including but not limited to agranulocytosis, aplastic anemia, seizures, rashes, retinopathy, and liver toxicity. Patient instructed to call the office should any adverse effect occur.  The patient verbalized understanding of the proper use and possible adverse effects of Plaquenil.  All the patient's questions and concerns were addressed.
Isotretinoin Pregnancy And Lactation Text: This medication is Pregnancy Category X and is considered extremely dangerous during pregnancy. It is unknown if it is excreted in breast milk.
Hyrimoz Counseling:  I discussed with the patient the risks of adalimumab including but not limited to myelosuppression, immunosuppression, autoimmune hepatitis, demyelinating diseases, lymphoma, and serious infections.  The patient understands that monitoring is required including a PPD at baseline and must alert us or the primary physician if symptoms of infection or other concerning signs are noted.
Azathioprine Pregnancy And Lactation Text: This medication is Pregnancy Category D and isn't considered safe during pregnancy. It is unknown if this medication is excreted in breast milk.
Dupixent Counseling: I discussed with the patient the risks of dupilumab including but not limited to eye infection and irritation, cold sores, injection site reactions, worsening of asthma, allergic reactions and increased risk of parasitic infection.  Live vaccines should be avoided while taking dupilumab. Dupilumab will also interact with certain medications such as warfarin and cyclosporine. The patient understands that monitoring is required and they must alert us or the primary physician if symptoms of infection or other concerning signs are noted.
Rinvoq Pregnancy And Lactation Text: Based on animal studies, Rinvoq may cause embryo-fetal harm when administered to pregnant women.  The medication should not be used in pregnancy.  Breastfeeding is not recommended during treatment and for 6 days after the last dose.
Tremfya Counseling: I discussed with the patient the risks of guselkumab including but not limited to immunosuppression, serious infections, worsening of inflammatory bowel disease and drug reactions.  The patient understands that monitoring is required including a PPD at baseline and must alert us or the primary physician if symptoms of infection or other concerning signs are noted.
Finasteride Male Counseling: Finasteride Counseling:  I discussed with the patient the risks of use of finasteride including but not limited to decreased libido, decreased ejaculate volume, gynecomastia, and depression. Women should not handle medication.  All of the patient's questions and concerns were addressed.
Nemluvio Pregnancy And Lactation Text: It is not known if Nemluvio causes fetal harm or is present in breast milk. Please proceed with caution if patients who are pregnant or breastfeeding.
Azithromycin Pregnancy And Lactation Text: This medication is considered safe during pregnancy and is also secreted in breast milk.
Metronidazole Counseling:  I discussed with the patient the risks of metronidazole including but not limited to seizures, nausea/vomiting, a metallic taste in the mouth, nausea/vomiting and severe allergy.
SSKI Counseling:  I discussed with the patient the risks of SSKI including but not limited to thyroid abnormalities, metallic taste, GI upset, fever, headache, acne, arthralgias, paraesthesias, lymphadenopathy, easy bleeding, arrhythmias, and allergic reaction.
Calcipotriene Counseling:  I discussed with the patient the risks of calcipotriene including but not limited to erythema, scaling, itching, and irritation.
Hydroxychloroquine Pregnancy And Lactation Text: This medication has been shown to cause fetal harm but it isn't assigned a Pregnancy Risk Category. There are small amounts excreted in breast milk.
Colchicine Counseling:  Patient counseled regarding adverse effects including but not limited to stomach upset (nausea, vomiting, stomach pain, or diarrhea).  Patient instructed to limit alcohol consumption while taking this medication.  Colchicine may reduce blood counts especially with prolonged use.  The patient understands that monitoring of kidney function and blood counts may be required, especially at baseline. The patient verbalized understanding of the proper use and possible adverse effects of colchicine.  All of the patient's questions and concerns were addressed.
Detail Level: Zone
High Dose Vitamin A Counseling: Side effects reviewed, pt to contact office should one occur.
Finasteride Female Counseling: Finasteride Counseling:  I discussed with the patient the risks of use of finasteride including but not limited to decreased libido and sexual dysfunction. Explained the teratogenic nature of the medication and stressed the importance of not getting pregnant during treatment. All of the patient's questions and concerns were addressed.
Bactrim Counseling:  I discussed with the patient the risks of sulfa antibiotics including but not limited to GI upset, allergic reaction, drug rash, diarrhea, dizziness, photosensitivity, and yeast infections.  Rarely, more serious reactions can occur including but not limited to aplastic anemia, agranulocytosis, methemoglobinemia, blood dyscrasias, liver or kidney failure, lung infiltrates or desquamative/blistering drug rashes.
Adbry Counseling: I discussed with the patient the risks of tralokinumab including but not limited to eye infection and irritation, cold sores, injection site reactions, worsening of asthma, allergic reactions and increased risk of parasitic infection.  Live vaccines should be avoided while taking tralokinumab. The patient understands that monitoring is required and they must alert us or the primary physician if symptoms of infection or other concerning signs are noted.
Odomzo Counseling- I discussed with the patient the risks of Odomzo including but not limited to nausea, vomiting, diarrhea, constipation, weight loss, changes in the sense of taste, decreased appetite, muscle spasms, and hair loss.  The patient verbalized understanding of the proper use and possible adverse effects of Odomzo.  All of the patient's questions and concerns were addressed.
Oral Minoxidil Counseling- I discussed with the patient the risks of oral minoxidil including but not limited to shortness of breath, swelling of the feet or ankles, dizziness, lightheadedness, unwanted hair growth and allergic reaction.  The patient verbalized understanding of the proper use and possible adverse effects of oral minoxidil.  All of the patient's questions and concerns were addressed.
Skyrizi Counseling: I discussed with the patient the risks of risankizumab-rzaa including but not limited to immunosuppression, and serious infections.  The patient understands that monitoring is required including a PPD at baseline and must alert us or the primary physician if symptoms of infection or other concerning signs are noted.
Cephalexin Counseling: I counseled the patient regarding use of cephalexin as an antibiotic for prophylactic and/or therapeutic purposes. Cephalexin (commonly prescribed under brand name Keflex) is a cephalosporin antibiotic which is active against numerous classes of bacteria, including most skin bacteria. Side effects may include nausea, diarrhea, gastrointestinal upset, rash, hives, yeast infections, and in rare cases, hepatitis, kidney disease, seizures, fever, confusion, neurologic symptoms, and others. Patients with severe allergies to penicillin medications are cautioned that there is about a 10% incidence of cross-reactivity with cephalosporins. When possible, patients with penicillin allergies should use alternatives to cephalosporins for antibiotic therapy.
Opioid Counseling: I discussed with the patient the potential side effects of opioids including but not limited to addiction, altered mental status, and depression. I stressed avoiding alcohol, benzodiazepines, muscle relaxants and sleep aids unless specifically okayed by a physician. The patient verbalized understanding of the proper use and possible adverse effects of opioids. All of the patient's questions and concerns were addressed. They were instructed to flush the remaining pills down the toilet if they did not need them for pain.
Cellcept Counseling:  I discussed with the patient the risks of mycophenolate mofetil including but not limited to infection/immunosuppression, GI upset, hypokalemia, hypercholesterolemia, bone marrow suppression, lymphoproliferative disorders, malignancy, GI ulceration/bleed/perforation, colitis, interstitial lung disease, kidney failure, progressive multifocal leukoencephalopathy, and birth defects.  The patient understands that monitoring is required including a baseline creatinine and regular CBC testing. In addition, patient must alert us immediately if symptoms of infection or other concerning signs are noted.
Opzelura Counseling:  I discussed with the patient the risks of Opzelura including but not limited to nasopharngitis, bronchitis, ear infection, eosinophila, hives, diarrhea, folliculitis, tonsillitis, and rhinorrhea.  Taken orally, this medication has been linked to serious infections; higher rate of mortality; malignancy and lymphoproliferative disorders; major adverse cardiovascular events; thrombosis; thrombocytopenia, anemia, and neutropenia; and lipid elevations.
Solaraze Pregnancy And Lactation Text: This medication is Pregnancy Category B and is considered safe. There is some data to suggest avoiding during the third trimester. It is unknown if this medication is excreted in breast milk.
Sotyktu Counseling:  I discussed the most common side effects of Sotyktu including: common cold, sore throat, sinus infections, cold sores, canker sores, folliculitis, and acne.? I also discussed more serious side effects of Sotyktu including but not limited to: serious allergic reactions; increased risk for infections such as TB; cancers such as lymphomas; rhabdomyolysis and elevated CPK; and elevated triglycerides and liver enzymes.?
Dupixent Pregnancy And Lactation Text: This medication likely crosses the placenta but the risk for the fetus is uncertain. This medication is excreted in breast milk.
Doxepin Pregnancy And Lactation Text: This medication is Pregnancy Category C and it isn't known if it is safe during pregnancy. It is also excreted in breast milk and breast feeding isn't recommended.
Topical Sulfur Applications Counseling: Topical Sulfur Counseling: Patient counseled that this medication may cause skin irritation or allergic reactions.  In the event of skin irritation, the patient was advised to reduce the amount of the drug applied or use it less frequently.   The patient verbalized understanding of the proper use and possible adverse effects of topical sulfur application.  All of the patient's questions and concerns were addressed.
Sski Pregnancy And Lactation Text: This medication is Pregnancy Category D and isn't considered safe during pregnancy. It is excreted in breast milk.
Adbry Pregnancy And Lactation Text: It is unknown if this medication will adversely affect pregnancy or breast feeding.
Metronidazole Pregnancy And Lactation Text: This medication is Pregnancy Category B and considered safe during pregnancy.  It is also excreted in breast milk.
Hydroquinone Counseling:  Patient advised that medication may result in skin irritation, lightening (hypopigmentation), dryness, and burning.  In the event of skin irritation, the patient was advised to reduce the amount of the drug applied or use it less frequently.  Rarely, spots that are treated with hydroquinone can become darker (pseudoochronosis).  Should this occur, patient instructed to stop medication and call the office. The patient verbalized understanding of the proper use and possible adverse effects of hydroquinone.  All of the patient's questions and concerns were addressed.
Rituxan Counseling:  I discussed with the patient the risks of Rituxan infusions. Side effects can include infusion reactions, severe drug rashes including mucocutaneous reactions, reactivation of latent hepatitis and other infections and rarely progressive multifocal leukoencephalopathy.  All of the patient's questions and concerns were addressed.
Calcipotriene Pregnancy And Lactation Text: This medication has not been proven safe during pregnancy. It is unknown if this medication is excreted in breast milk.
Oral Minoxidil Pregnancy And Lactation Text: This medication should only be used when clearly needed if you are pregnant, attempting to become pregnant or breast feeding.
Ilumya Counseling: I discussed with the patient the risks of tildrakizumab including but not limited to immunosuppression, malignancy, posterior leukoencephalopathy syndrome, and serious infections.  The patient understands that monitoring is required including a PPD at baseline and must alert us or the primary physician if symptoms of infection or other concerning signs are noted.
Thalidomide Counseling: I discussed with the patient the risks of thalidomide including but not limited to birth defects, anxiety, weakness, chest pain, dizziness, cough and severe allergy.
Opioid Pregnancy And Lactation Text: These medications can lead to premature delivery and should be avoided during pregnancy. These medications are also present in breast milk in small amounts.
High Dose Vitamin A Pregnancy And Lactation Text: High dose vitamin A therapy is contraindicated during pregnancy and breast feeding.
Opzelura Pregnancy And Lactation Text: There is insufficient data to evaluate drug-associated risk for major birth defects, miscarriage, or other adverse maternal or fetal outcomes.  There is a pregnancy registry that monitors pregnancy outcomes in pregnant persons exposed to the medication during pregnancy.  It is unknown if this medication is excreted in breast milk.  Do not breastfeed during treatment and for about 4 weeks after the last dose.
Finasteride Pregnancy And Lactation Text: This medication is absolutely contraindicated during pregnancy. It is unknown if it is excreted in breast milk.
Bactrim Pregnancy And Lactation Text: This medication is Pregnancy Category D and is known to cause fetal risk.  It is also excreted in breast milk.
Cibinqo Counseling: I discussed with the patient the risks of Cibinqo therapy including but not limited to common cold, nausea, headache, cold sores, increased blood CPK levels, dizziness, UTIs, fatigue, acne, and vomitting. Live vaccines should be avoided.  This medication has been linked to serious infections; higher rate of mortality; malignancy and lymphoproliferative disorders; major adverse cardiovascular events; thrombosis; thrombocytopenia and lymphopenia; lipid elevations; and retinal detachment.
Topical Sulfur Applications Pregnancy And Lactation Text: This medication is Pregnancy Category C and has an unknown safety profile during pregnancy. It is unknown if this topical medication is excreted in breast milk.
Topical Retinoid counseling:  Patient advised to apply a pea-sized amount only at bedtime and wait 30 minutes after washing their face before applying.  If too drying, patient may add a non-comedogenic moisturizer. The patient verbalized understanding of the proper use and possible adverse effects of retinoids.  All of the patient's questions and concerns were addressed.
Cephalexin Pregnancy And Lactation Text: This medication is Pregnancy Category B and considered safe during pregnancy.  It is also excreted in breast milk but can be used safely for shorter doses.
Tetracycline Counseling: Patient counseled regarding possible photosensitivity and increased risk for sunburn.  Patient instructed to avoid sunlight, if possible.  When exposed to sunlight, patients should wear protective clothing, sunglasses, and sunscreen.  The patient was instructed to call the office immediately if the following severe adverse effects occur:  hearing changes, easy bruising/bleeding, severe headache, or vision changes.  The patient verbalized understanding of the proper use and possible adverse effects of tetracycline.  All of the patient's questions and concerns were addressed. Patient understands to avoid pregnancy while on therapy due to potential birth defects.
Low Dose Naltrexone Counseling- I discussed with the patient the potential risks and side effects of low dose naltrexone including but not limited to: more vivid dreams, headaches, nausea, vomiting, abdominal pain, fatigue, dizziness, and anxiety.
5-Fu Counseling: 5-Fluorouracil Counseling:  I discussed with the patient the risks of 5-fluorouracil including but not limited to erythema, scaling, itching, weeping, crusting, and pain.
Cyclophosphamide Counseling:  I discussed with the patient the risks of cyclophosphamide including but not limited to hair loss, hormonal abnormalities, decreased fertility, abdominal pain, diarrhea, nausea and vomiting, bone marrow suppression and infection. The patient understands that monitoring is required while taking this medication.
Xelliliz Pregnancy And Lactation Text: This medication is Pregnancy Category D and is not considered safe during pregnancy.  The risk during breast feeding is also uncertain.
Ebglyss Counseling: I discussed with the patient the risks of lebrikizumab including but not limited to eye inflammation and irritation, cold sores, injection site reactions, allergic reactions and increased risk of parasitic infection. The patient understands that monitoring is required and they must alert us or the primary physician if symptoms of infection or other concerning signs are noted.
Ketoconazole Counseling:   Patient counseled regarding improving absorption with orange juice.  Adverse effects include but are not limited to breast enlargement, headache, diarrhea, nausea, upset stomach, liver function test abnormalities, taste disturbance, and stomach pain.  There is a rare possibility of liver failure that can occur when taking ketoconazole. The patient understands that monitoring of LFTs may be required, especially at baseline. The patient verbalized understanding of the proper use and possible adverse effects of ketoconazole.  All of the patient's questions and concerns were addressed.
Hydroxyzine Counseling: Patient advised that the medication is sedating and not to drive a car after taking this medication.  Patient informed of potential adverse effects including but not limited to dry mouth, urinary retention, and blurry vision.  The patient verbalized understanding of the proper use and possible adverse effects of hydroxyzine.  All of the patient's questions and concerns were addressed.
Bimzelx Counseling:  I discussed with the patient the risks of Bimzelx including but not limited to depression, immunosuppression, allergic reactions and infections.  The patient understands that monitoring is required including a PPD at baseline and must alert us or the primary physician if symptoms of infection or other concerning signs are noted.
Minocycline Counseling: Patient advised regarding possible photosensitivity and discoloration of the teeth, skin, lips, tongue and gums.  Patient instructed to avoid sunlight, if possible.  When exposed to sunlight, patients should wear protective clothing, sunglasses, and sunscreen.  The patient was instructed to call the office immediately if the following severe adverse effects occur:  hearing changes, easy bruising/bleeding, severe headache, or vision changes.  The patient verbalized understanding of the proper use and possible adverse effects of minocycline.  All of the patient's questions and concerns were addressed.
Picato Counseling:  I discussed with the patient the risks of Picato including but not limited to erythema, scaling, itching, weeping, crusting, and pain.
Xolair Counseling:  Patient informed of potential adverse effects including but not limited to fever, muscle aches, rash and allergic reactions.  The patient verbalized understanding of the proper use and possible adverse effects of Xolair.  All of the patient's questions and concerns were addressed.
Rituxan Pregnancy And Lactation Text: This medication is Pregnancy Category C and it isn't know if it is safe during pregnancy. It is unknown if this medication is excreted in breast milk but similar antibodies are known to be excreted.
Sotyktu Pregnancy And Lactation Text: There is insufficient data to evaluate whether or not Sotyktu is safe to use during pregnancy.? ?It is not known if Sotyktu passes into breast milk and whether or not it is safe to use when breastfeeding.??

## 2025-01-20 NOTE — PROCEDURE: COUNSELING
Detail Level: Generalized
Sunscreen Recommendations: Recommend sunscreen daily, or sun protective clothing.  I recommend a zinc or titanium based sunscreen with a spf of 30 or greater.
Detail Level: Simple

## 2025-01-20 NOTE — PROCEDURE: TREATMENT REGIMEN
Initiate Treatment: - finasteride 1 mg tablet. Take one pill once a day
Continue Regimen: - minoxidil 2.5 mg tablet. Take half a tablet once a day
Detail Level: Zone
Otc Regimen: - pt using nutrafol Women’s balance, taking only 2 pills right now(because it is expensive). Discussed doing two pills at lunch time and two at dinner time

## 2025-07-31 ENCOUNTER — RX ONLY (RX ONLY)
Age: 55
End: 2025-07-31

## 2025-07-31 RX ORDER — FINASTERIDE 1 MG/1
1 TABLET, FILM COATED ORAL QD
Qty: 90 | Refills: 1 | Status: ERX

## 2025-09-02 ENCOUNTER — APPOINTMENT (OUTPATIENT)
Dept: NEUROLOGY | Facility: MEDICAL CENTER | Age: 55
End: 2025-09-02
Attending: PSYCHIATRY & NEUROLOGY
Payer: COMMERCIAL

## (undated) DEVICE — SPONGE GAUZESTER. 2X2 4-PL - (2/PK 50PK/BX 30BX/CS)

## (undated) DEVICE — BANDAGE ELASTIC 4 HONEYCOMB - 4"X5YD LF (20/CA)"

## (undated) DEVICE — DRESSING ABDOMINAL PAD STERILE 8 X 10" (360EA/CA)"

## (undated) DEVICE — TUBE CONNECTING SUCTION - CLEAR PLASTIC STERILE 72 IN (50EA/CA)

## (undated) DEVICE — APPLIER OPEN MEDIUM CLIP (6EA/BX)

## (undated) DEVICE — HEAD HOLDER JUNIOR/ADULT

## (undated) DEVICE — DRESSING TRANSPARENT FILM TEGADERM 2.375 X 2.75"  (100EA/BX)"

## (undated) DEVICE — BLADE SURGICAL #15 - (50/BX 3BX/CA)

## (undated) DEVICE — SET MULTI-ADD FLUID TRANSFER 42 INCH - (50/CA)THIS IS A CUSTOM MADE ITEM 4 TO 6 WEEK LEAD TIME

## (undated) DEVICE — DRESSING PETROLEUM GAUZE 5 X 9" (50EA/BX 4BX/CA)"

## (undated) DEVICE — CANISTER SUCTION 3000ML MECHANICAL FILTER AUTO SHUTOFF MEDI-VAC NONSTERILE LF DISP  (40EA/CA)

## (undated) DEVICE — CANISTER SUCTION RIGID RED 1500CC (40EA/CA)

## (undated) DEVICE — PACK MINOR BASIN - (2EA/CA)

## (undated) DEVICE — SET LEADWIRE 5 LEAD BEDSIDE DISPOSABLE ECG (1SET OF 5/EA)

## (undated) DEVICE — GOWN WARMING STANDARD FLEX - (30/CA)

## (undated) DEVICE — SUTURE 3-0 VICRYL PLUS SH - 8X 18 INCH (12/BX)

## (undated) DEVICE — WATER IRRIGATION STERILE 1000ML (12EA/CA)

## (undated) DEVICE — SUTURE 3-0 MONOCRYL PLUS PS-1 - 27 INCH (36/BX)

## (undated) DEVICE — ELECTRODE DUAL RETURN W/ CORD - (50/PK)

## (undated) DEVICE — DRESSING NON-ADHERING 8 X 3 - (50/BX)

## (undated) DEVICE — DEVICE MONOPOLAR RF PEAK PLASMABLADE 3.0S

## (undated) DEVICE — SUTURE 2-0 VICRYL PLUS SH - 27 INCH (36/BX)

## (undated) DEVICE — SENSOR SPO2 NEO LNCS ADHESIVE (20/BX) SEE USER NOTES

## (undated) DEVICE — ELECTRODE 850 FOAM ADHESIVE - HYDROGEL RADIOTRNSPRNT (50/PK)

## (undated) DEVICE — KIT  I.V. START (100EA/CA)

## (undated) DEVICE — SUTURE 4-0 MONOCRYL PLUS PS-2 - 27 INCH (36/BX)

## (undated) DEVICE — BANDAGE ELASTIC STERILE MATRIX 6 X 10 (20EA/CA)

## (undated) DEVICE — GLOVE BIOGEL PI ORTHO SZ 7.5 PF LF (40PR/BX)

## (undated) DEVICE — TOWELS CLOTH SURGICAL - (4/PK 20PK/CA)

## (undated) DEVICE — PAD LAP STERILE 18 X 18 - (5/PK 40PK/CA)

## (undated) DEVICE — DRESSING NON ADHERENT 3 X 4 - STERILE (100/BX 12BX/CA)

## (undated) DEVICE — BANDAGE ELASTIC 6 HONEYCOMB - 6X5YD LF (20/CA)"

## (undated) DEVICE — LACTATED RINGERS INJ 1000 ML - (14EA/CA 60CA/PF)

## (undated) DEVICE — SUTURE GENERAL

## (undated) DEVICE — KIT ANESTHESIA W/CIRCUIT & 3/LT BAG W/FILTER (20EA/CA)

## (undated) DEVICE — SHEET TRANSVERSE LAP - (12EA/CA)

## (undated) DEVICE — CLOSURE SKIN STRIP 1/2 X 4 IN - (STERI STRIP) (50/BX 4BX/CA)

## (undated) DEVICE — GLOVE BIOGEL SZ 8.5 SURGICAL PF LTX - (50PR/BX 4BX/CA)

## (undated) DEVICE — SUTURE 3-0 MONOCRYL PLUS PS-2 - (12/BX)

## (undated) DEVICE — DRESSING XEROFORM 1X8 - (50/BX 4BX/CA)

## (undated) DEVICE — MASK ANESTHESIA ADULT  - (100/CA)

## (undated) DEVICE — DRAPE LAPAROTOMY T SHEET - (12EA/CA)

## (undated) DEVICE — CATHETER IV 20 GA X 1-1/4 ---SURG.& SDS ONLY--- (50EA/BX)

## (undated) DEVICE — MANIFOLD NEPTUNE 1 PORT (20/PK)

## (undated) DEVICE — SODIUM CHL IRRIGATION 0.9% 1000ML (12EA/CA)

## (undated) DEVICE — SUCTION INSTRUMENT YANKAUER BULBOUS TIP W/O VENT (50EA/CA)

## (undated) DEVICE — GLOVE SZ 6.5 BIOGEL PI MICRO - PF LF (50PR/BX)

## (undated) DEVICE — GOWN SURGEONS LARGE - (32/CA)

## (undated) DEVICE — STAPLER SKIN DISP - (6/BX 10BX/CA) VISISTAT

## (undated) DEVICE — NEPTUNE 4 PORT MANIFOLD - (20/PK)

## (undated) DEVICE — SOD. CHL. INJ. 0.9% 1000 ML - (14EA/CA 60CA/PF)

## (undated) DEVICE — CHLORAPREP 26 ML APPLICATOR - ORANGE TINT(25/CA)

## (undated) DEVICE — SYRINGE 10 ML CONTROL LL (25EA/BX 4BX/CA)

## (undated) DEVICE — SLEEVE, VASO, THIGH, MED

## (undated) DEVICE — PACK MAJOR BASIN - (2EA/CA)

## (undated) DEVICE — TUBING CLEARLINK DUO-VENT - C-FLO (48EA/CA)

## (undated) DEVICE — PROTECTOR ULNA NERVE - (36PR/CA)

## (undated) DEVICE — SUTURE 3-0 VICRYL PLUS SH - 27 INCH (36/BX)

## (undated) DEVICE — SYRINGE DISP. 60 CC LL - (30/BX, 12BX/CA)**WHEN THESE ARE GONE ORDER #500206**

## (undated) DEVICE — GLOVE BIOGEL SZ 7.5 SURGICAL PF LTX - (50PR/BX 4BX/CA)

## (undated) DEVICE — DRAPE LARGE 3 QUARTER - (20/CA)

## (undated) DEVICE — SUTURE 3-0 VICRYL PLUS FS-1 27 (36PK/BX)"

## (undated) DEVICE — SET EXTENSION WITH 2 PORTS (48EA/CA) ***PART #2C8610 IS A SUBSTITUTE*****

## (undated) DEVICE — DRESSING TRANSPARENT FILM TEGADERM 4 X 4.75" (50EA/BX)"

## (undated) DEVICE — GLOVE BIOGEL PI INDICATOR SZ 7.0 SURGICAL PF LF - (50/BX 4BX/CA)

## (undated) DEVICE — GLOVE BIOGEL INDICATOR SZ 6.5 SURGICAL PF LTX - (50PR/BX 4BX/CA)

## (undated) DEVICE — PAD PROVIDONE IODINE 2-1/8X1 (100EA/BX)

## (undated) DEVICE — MASK, LARYNGEAL AIRWAY #4

## (undated) DEVICE — COVER PROBE STERILE CONE (12EA/CA)

## (undated) DEVICE — SUTURE 2-0 SILK FS (12EA/BX)

## (undated) DEVICE — GLOVE BIOGEL SZ 6.5 SURGICAL PF LTX (50PR/BX 4BX/CA)